# Patient Record
Sex: FEMALE | Race: WHITE | Employment: OTHER | URBAN - NONMETROPOLITAN AREA
[De-identification: names, ages, dates, MRNs, and addresses within clinical notes are randomized per-mention and may not be internally consistent; named-entity substitution may affect disease eponyms.]

---

## 2017-01-30 ENCOUNTER — DOCTOR'S OFFICE (OUTPATIENT)
Dept: URBAN - NONMETROPOLITAN AREA CLINIC 1 | Facility: CLINIC | Age: 82
Setting detail: OPHTHALMOLOGY
End: 2017-01-30
Payer: COMMERCIAL

## 2017-01-30 ENCOUNTER — RX ONLY (RX ONLY)
Age: 82
End: 2017-01-30

## 2017-01-30 DIAGNOSIS — H04.122: ICD-10-CM

## 2017-01-30 DIAGNOSIS — H04.121: ICD-10-CM

## 2017-01-30 DIAGNOSIS — H43.812: ICD-10-CM

## 2017-01-30 DIAGNOSIS — H35.373: ICD-10-CM

## 2017-01-30 DIAGNOSIS — H16.001: ICD-10-CM

## 2017-01-30 DIAGNOSIS — E11.3493: ICD-10-CM

## 2017-01-30 DIAGNOSIS — H43.811: ICD-10-CM

## 2017-01-30 PROCEDURE — 83861 MICROFLUID ANALY TEARS: CPT | Performed by: OPHTHALMOLOGY

## 2017-01-30 PROCEDURE — 92014 COMPRE OPH EXAM EST PT 1/>: CPT | Performed by: OPHTHALMOLOGY

## 2017-01-30 PROCEDURE — 92226 OPHTHALMOSCOPY EXT SUBSEQUENT: CPT | Performed by: OPHTHALMOLOGY

## 2017-01-30 PROCEDURE — 92134 CPTRZ OPH DX IMG PST SGM RTA: CPT | Performed by: OPHTHALMOLOGY

## 2017-01-30 ASSESSMENT — REFRACTION_MANIFEST
OS_VA3: 20/
OS_VA1: 20/
OD_VA2: 20/
OS_VA1: 20/
OS_SPHERE: PLANO
OD_VA3: 20/
OD_VA1: 20/
OS_VA1: 20/25
OD_VA1: 20/
OS_VA3: 20/
OD_VA2: 20/25-2
OU_VA: 20/
OS_VA2: 20/
OS_ADD: +2.50
OS_VA3: 20/
OS_VA2: 20/
OS_AXIS: 030
OD_ADD: +2.50
OU_VA: 20/
OD_VA3: 20/
OD_SPHERE: PLANO
OD_VA2: 20/
OD_VA3: 20/
OD_VA1: 20/25-2
OS_VA2: 20/25
OD_CYLINDER: -0.25
OU_VA: 20/
OD_AXIS: 095
OS_CYLINDER: -0.50

## 2017-01-30 ASSESSMENT — LID POSITION - DERMATOCHALASIS
OD_DERMATOCHALASIS: RUL 2+
OS_DERMATOCHALASIS: LUL 2+

## 2017-01-30 ASSESSMENT — REFRACTION_CURRENTRX
OD_OVR_VA: 20/
OS_OVR_VA: 20/
OD_OVR_VA: 20/
OS_OVR_VA: 20/
OD_OVR_VA: 20/
OS_OVR_VA: 20/

## 2017-01-30 ASSESSMENT — REFRACTION_AUTOREFRACTION
OD_CYLINDER: -0.25
OD_SPHERE: +0.25
OD_AXIS: 98
OS_CYLINDER: -0.50
OS_SPHERE: +0.00
OS_AXIS: 49

## 2017-01-30 ASSESSMENT — SPHEQUIV_DERIVED
OD_SPHEQUIV: 0.125
OS_SPHEQUIV: -0.25

## 2017-01-30 ASSESSMENT — CONFRONTATIONAL VISUAL FIELD TEST (CVF)
OS_FINDINGS: FULL
OD_FINDINGS: FULL

## 2017-01-30 ASSESSMENT — VISUAL ACUITY
OS_BCVA: 20/20-2
OD_BCVA: 20/25

## 2017-02-16 ENCOUNTER — DOCTOR'S OFFICE (OUTPATIENT)
Dept: URBAN - NONMETROPOLITAN AREA CLINIC 1 | Facility: CLINIC | Age: 82
Setting detail: OPHTHALMOLOGY
End: 2017-02-16
Payer: COMMERCIAL

## 2017-02-16 DIAGNOSIS — H04.122: ICD-10-CM

## 2017-02-16 DIAGNOSIS — H04.121: ICD-10-CM

## 2017-02-16 PROCEDURE — 68761 CLOSE TEAR DUCT OPENING: CPT | Performed by: OPHTHALMOLOGY

## 2017-02-16 ASSESSMENT — REFRACTION_MANIFEST
OD_SPHERE: PLANO
OS_ADD: +2.50
OS_VA1: 20/
OS_VA2: 20/
OS_VA2: 20/25
OS_VA3: 20/
OD_VA3: 20/
OS_VA1: 20/
OD_VA1: 20/
OD_VA3: 20/
OD_VA1: 20/25-2
OS_VA3: 20/
OD_CYLINDER: -0.25
OU_VA: 20/
OD_ADD: +2.50
OD_AXIS: 095
OD_VA1: 20/
OS_VA2: 20/
OD_VA3: 20/
OS_CYLINDER: -0.50
OD_VA2: 20/25-2
OU_VA: 20/
OS_VA3: 20/
OS_VA1: 20/25
OD_VA2: 20/
OS_AXIS: 030
OD_VA2: 20/
OU_VA: 20/
OS_SPHERE: PLANO

## 2017-02-16 ASSESSMENT — REFRACTION_AUTOREFRACTION
OS_CYLINDER: -0.50
OD_CYLINDER: -0.25
OD_AXIS: 98
OS_SPHERE: +0.00
OS_AXIS: 49
OD_SPHERE: +0.25

## 2017-02-16 ASSESSMENT — LID POSITION - DERMATOCHALASIS
OD_DERMATOCHALASIS: RUL 2+
OS_DERMATOCHALASIS: LUL 2+

## 2017-02-16 ASSESSMENT — REFRACTION_CURRENTRX
OD_OVR_VA: 20/
OS_OVR_VA: 20/
OD_OVR_VA: 20/
OD_OVR_VA: 20/
OS_OVR_VA: 20/
OS_OVR_VA: 20/

## 2017-02-16 ASSESSMENT — SPHEQUIV_DERIVED
OS_SPHEQUIV: -0.25
OD_SPHEQUIV: 0.125

## 2017-02-16 ASSESSMENT — VISUAL ACUITY
OS_BCVA: 20/20-2
OD_BCVA: 20/25

## 2017-02-16 ASSESSMENT — CONFRONTATIONAL VISUAL FIELD TEST (CVF)
OS_FINDINGS: FULL
OD_FINDINGS: FULL

## 2017-03-16 ENCOUNTER — DOCTOR'S OFFICE (OUTPATIENT)
Dept: URBAN - NONMETROPOLITAN AREA CLINIC 1 | Facility: CLINIC | Age: 82
Setting detail: OPHTHALMOLOGY
End: 2017-03-16
Payer: COMMERCIAL

## 2017-03-16 DIAGNOSIS — H04.121: ICD-10-CM

## 2017-03-16 DIAGNOSIS — H04.122: ICD-10-CM

## 2017-03-16 PROCEDURE — 83861 MICROFLUID ANALY TEARS: CPT | Performed by: OPHTHALMOLOGY

## 2017-03-16 PROCEDURE — 99213 OFFICE O/P EST LOW 20 MIN: CPT | Performed by: OPHTHALMOLOGY

## 2017-03-16 ASSESSMENT — REFRACTION_MANIFEST
OS_VA2: 20/
OU_VA: 20/
OD_VA3: 20/
OD_VA1: 20/
OS_VA1: 20/
OU_VA: 20/
OD_ADD: +2.50
OD_VA2: 20/
OD_CYLINDER: -0.25
OD_VA3: 20/
OS_VA3: 20/
OS_VA1: 20/25
OS_VA3: 20/
OS_AXIS: 030
OS_VA2: 20/25
OD_AXIS: 095
OD_VA1: 20/25-2
OD_VA2: 20/
OD_VA2: 20/25-2
OS_VA3: 20/
OS_VA2: 20/
OS_SPHERE: PLANO
OS_ADD: +2.50
OD_SPHERE: PLANO
OS_VA1: 20/
OS_CYLINDER: -0.50
OU_VA: 20/
OD_VA1: 20/
OD_VA3: 20/

## 2017-03-16 ASSESSMENT — SPHEQUIV_DERIVED
OS_SPHEQUIV: -0.25
OD_SPHEQUIV: 0.125

## 2017-03-16 ASSESSMENT — VISUAL ACUITY
OD_BCVA: 20/25+1
OS_BCVA: 20/20-2

## 2017-03-16 ASSESSMENT — LID POSITION - DERMATOCHALASIS
OD_DERMATOCHALASIS: RUL 2+
OS_DERMATOCHALASIS: LUL 2+

## 2017-03-16 ASSESSMENT — REFRACTION_CURRENTRX
OD_OVR_VA: 20/
OS_OVR_VA: 20/
OD_OVR_VA: 20/
OS_OVR_VA: 20/
OS_OVR_VA: 20/
OD_OVR_VA: 20/

## 2017-03-16 ASSESSMENT — REFRACTION_AUTOREFRACTION
OD_AXIS: 98
OS_CYLINDER: -0.50
OS_AXIS: 49
OS_SPHERE: +0.00
OD_CYLINDER: -0.25
OD_SPHERE: +0.25

## 2017-03-30 ENCOUNTER — DOCTOR'S OFFICE (OUTPATIENT)
Dept: URBAN - NONMETROPOLITAN AREA CLINIC 1 | Facility: CLINIC | Age: 82
Setting detail: OPHTHALMOLOGY
End: 2017-03-30
Payer: COMMERCIAL

## 2017-03-30 DIAGNOSIS — H04.123: ICD-10-CM

## 2017-03-30 PROCEDURE — 68761 CLOSE TEAR DUCT OPENING: CPT | Performed by: OPHTHALMOLOGY

## 2017-03-30 ASSESSMENT — LID POSITION - DERMATOCHALASIS
OS_DERMATOCHALASIS: LUL 2+
OD_DERMATOCHALASIS: RUL 2+

## 2017-03-30 ASSESSMENT — SPHEQUIV_DERIVED
OD_SPHEQUIV: 0.125
OS_SPHEQUIV: -0.25

## 2017-03-30 ASSESSMENT — REFRACTION_MANIFEST
OD_VA1: 20/
OS_VA1: 20/
OS_VA3: 20/
OD_VA3: 20/
OD_VA1: 20/
OS_VA2: 20/
OU_VA: 20/
OS_VA3: 20/
OS_VA2: 20/
OD_VA2: 20/
OD_VA2: 20/
OS_VA1: 20/
OS_VA2: 20/
OD_VA1: 20/
OD_VA2: 20/
OS_VA1: 20/
OS_VA3: 20/
OD_VA3: 20/
OU_VA: 20/
OD_VA3: 20/
OU_VA: 20/

## 2017-03-30 ASSESSMENT — PUNCTA - ASSESSMENT
OD_PUNCTA: SIL PLUG RLL SMALL
OS_PUNCTA: SIL PLUG LLL SMALL

## 2017-03-30 ASSESSMENT — REFRACTION_CURRENTRX
OD_OVR_VA: 20/
OS_OVR_VA: 20/

## 2017-03-30 ASSESSMENT — REFRACTION_AUTOREFRACTION
OD_CYLINDER: -0.25
OS_CYLINDER: -0.50
OS_AXIS: 49
OS_SPHERE: +0.00
OD_AXIS: 98
OD_SPHERE: +0.25

## 2017-03-30 ASSESSMENT — VISUAL ACUITY
OS_BCVA: 20/20-2
OD_BCVA: 20/25+1

## 2017-03-31 ENCOUNTER — DOCTOR'S OFFICE (OUTPATIENT)
Dept: URBAN - NONMETROPOLITAN AREA CLINIC 1 | Facility: CLINIC | Age: 82
Setting detail: OPHTHALMOLOGY
End: 2017-03-31
Payer: COMMERCIAL

## 2017-03-31 DIAGNOSIS — H04.122: ICD-10-CM

## 2017-03-31 DIAGNOSIS — H04.121: ICD-10-CM

## 2017-03-31 PROCEDURE — 99024 POSTOP FOLLOW-UP VISIT: CPT | Performed by: OPHTHALMOLOGY

## 2017-03-31 ASSESSMENT — REFRACTION_CURRENTRX
OD_OVR_VA: 20/
OS_OVR_VA: 20/
OD_OVR_VA: 20/
OD_OVR_VA: 20/

## 2017-03-31 ASSESSMENT — REFRACTION_MANIFEST
OD_VA3: 20/
OS_VA3: 20/
OD_VA1: 20/
OD_VA2: 20/
OD_VA1: 20/
OU_VA: 20/
OD_VA2: 20/
OD_VA1: 20/
OU_VA: 20/
OS_VA1: 20/
OS_VA1: 20/
OU_VA: 20/
OS_VA1: 20/
OS_VA3: 20/
OS_VA2: 20/
OS_VA2: 20/
OD_VA3: 20/
OD_VA3: 20/
OS_VA3: 20/
OD_VA2: 20/
OS_VA2: 20/

## 2017-03-31 ASSESSMENT — REFRACTION_AUTOREFRACTION
OS_CYLINDER: -0.50
OS_AXIS: 49
OD_AXIS: 98
OD_SPHERE: +0.25
OD_CYLINDER: -0.25
OS_SPHERE: +0.00

## 2017-03-31 ASSESSMENT — CONFRONTATIONAL VISUAL FIELD TEST (CVF)
OS_FINDINGS: FULL
OD_FINDINGS: FULL

## 2017-03-31 ASSESSMENT — SPHEQUIV_DERIVED
OD_SPHEQUIV: 0.125
OS_SPHEQUIV: -0.25

## 2017-03-31 ASSESSMENT — LID POSITION - DERMATOCHALASIS
OS_DERMATOCHALASIS: LUL 2+
OD_DERMATOCHALASIS: RUL 2+

## 2017-03-31 ASSESSMENT — VISUAL ACUITY
OS_BCVA: 20/20
OD_BCVA: 20/20

## 2017-03-31 ASSESSMENT — PUNCTA - ASSESSMENT: OS_PUNCTA: SIL PLUG LLL SMALL

## 2017-04-06 ENCOUNTER — RX ONLY (RX ONLY)
Age: 82
End: 2017-04-06

## 2017-04-06 ENCOUNTER — DOCTOR'S OFFICE (OUTPATIENT)
Dept: URBAN - NONMETROPOLITAN AREA CLINIC 1 | Facility: CLINIC | Age: 82
Setting detail: OPHTHALMOLOGY
End: 2017-04-06
Payer: COMMERCIAL

## 2017-04-06 DIAGNOSIS — H04.121: ICD-10-CM

## 2017-04-06 PROCEDURE — 68761 CLOSE TEAR DUCT OPENING: CPT | Performed by: OPHTHALMOLOGY

## 2017-04-06 ASSESSMENT — REFRACTION_AUTOREFRACTION
OD_CYLINDER: -0.25
OS_SPHERE: +0.00
OD_SPHERE: +0.25
OD_AXIS: 98
OS_AXIS: 49
OS_CYLINDER: -0.50

## 2017-04-06 ASSESSMENT — SPHEQUIV_DERIVED
OD_SPHEQUIV: 0.125
OS_SPHEQUIV: -0.25

## 2017-04-06 ASSESSMENT — REFRACTION_MANIFEST
OU_VA: 20/
OU_VA: 20/
OD_VA2: 20/
OS_VA3: 20/
OD_VA2: 20/
OS_VA1: 20/
OS_VA2: 20/
OD_VA3: 20/
OD_VA2: 20/
OD_VA1: 20/
OS_VA3: 20/
OS_VA2: 20/
OS_VA1: 20/
OD_VA1: 20/
OU_VA: 20/
OS_VA3: 20/
OD_VA3: 20/
OS_VA1: 20/
OD_VA1: 20/
OS_VA2: 20/
OD_VA3: 20/

## 2017-04-06 ASSESSMENT — PUNCTA - ASSESSMENT
OS_PUNCTA: SIL PLUG LLL SMALL
OD_PUNCTA: 3MON PLUG RLL SMALL

## 2017-04-06 ASSESSMENT — VISUAL ACUITY
OS_BCVA: 20/20
OD_BCVA: 20/20-2

## 2017-04-06 ASSESSMENT — LID POSITION - DERMATOCHALASIS
OD_DERMATOCHALASIS: RUL 2+
OS_DERMATOCHALASIS: LUL 2+

## 2017-04-06 ASSESSMENT — REFRACTION_CURRENTRX
OD_OVR_VA: 20/
OS_OVR_VA: 20/
OD_OVR_VA: 20/
OD_OVR_VA: 20/
OS_OVR_VA: 20/
OS_OVR_VA: 20/

## 2017-04-06 ASSESSMENT — CONFRONTATIONAL VISUAL FIELD TEST (CVF)
OD_FINDINGS: FULL
OS_FINDINGS: FULL

## 2017-05-18 ENCOUNTER — DOCTOR'S OFFICE (OUTPATIENT)
Dept: URBAN - NONMETROPOLITAN AREA CLINIC 1 | Facility: CLINIC | Age: 82
Setting detail: OPHTHALMOLOGY
End: 2017-05-18
Payer: COMMERCIAL

## 2017-05-18 DIAGNOSIS — H04.121: ICD-10-CM

## 2017-05-18 DIAGNOSIS — H43.811: ICD-10-CM

## 2017-05-18 DIAGNOSIS — H04.122: ICD-10-CM

## 2017-05-18 DIAGNOSIS — E11.3493: ICD-10-CM

## 2017-05-18 DIAGNOSIS — H43.812: ICD-10-CM

## 2017-05-18 PROCEDURE — 83861 MICROFLUID ANALY TEARS: CPT | Performed by: OPHTHALMOLOGY

## 2017-05-18 PROCEDURE — 92226 OPHTHALMOSCOPY EXT SUBSEQUENT: CPT | Performed by: OPHTHALMOLOGY

## 2017-05-18 PROCEDURE — 92014 COMPRE OPH EXAM EST PT 1/>: CPT | Performed by: OPHTHALMOLOGY

## 2017-05-18 PROCEDURE — 92134 CPTRZ OPH DX IMG PST SGM RTA: CPT | Performed by: OPHTHALMOLOGY

## 2017-05-18 ASSESSMENT — PUNCTA - ASSESSMENT
OS_PUNCTA: SIL PLUG LLL SMALL
OD_PUNCTA: 3MON PLUG RLL SMALL

## 2017-05-18 ASSESSMENT — LID POSITION - DERMATOCHALASIS
OS_DERMATOCHALASIS: LUL 2+
OD_DERMATOCHALASIS: RUL 2+

## 2017-05-18 ASSESSMENT — CONFRONTATIONAL VISUAL FIELD TEST (CVF)
OS_FINDINGS: FULL
OD_FINDINGS: FULL

## 2017-05-24 ASSESSMENT — REFRACTION_MANIFEST
OS_VA1: 20/
OS_VA2: 20/
OD_VA3: 20/
OS_VA3: 20/
OU_VA: 20/
OU_VA: 20/
OS_VA1: 20/
OS_VA2: 20/
OD_VA2: 20/
OU_VA: 20/
OD_VA1: 20/
OS_VA1: 20/
OD_VA3: 20/
OD_VA1: 20/
OS_VA3: 20/
OS_VA3: 20/
OD_VA1: 20/
OS_VA2: 20/
OD_VA3: 20/

## 2017-05-24 ASSESSMENT — VISUAL ACUITY
OS_BCVA: 20/15
OD_BCVA: 20/20

## 2017-05-24 ASSESSMENT — SPHEQUIV_DERIVED
OS_SPHEQUIV: -0.25
OD_SPHEQUIV: 0.125

## 2017-05-24 ASSESSMENT — REFRACTION_AUTOREFRACTION
OS_SPHERE: +0.00
OD_AXIS: 98
OS_AXIS: 49
OD_CYLINDER: -0.25
OD_SPHERE: +0.25
OS_CYLINDER: -0.50

## 2017-05-24 ASSESSMENT — REFRACTION_CURRENTRX
OD_OVR_VA: 20/
OS_OVR_VA: 20/
OD_OVR_VA: 20/
OD_OVR_VA: 20/

## 2017-06-22 ENCOUNTER — DOCTOR'S OFFICE (OUTPATIENT)
Dept: URBAN - NONMETROPOLITAN AREA CLINIC 1 | Facility: CLINIC | Age: 82
Setting detail: OPHTHALMOLOGY
End: 2017-06-22
Payer: COMMERCIAL

## 2017-06-22 DIAGNOSIS — E11.3492: ICD-10-CM

## 2017-06-22 DIAGNOSIS — E11.3491: ICD-10-CM

## 2017-06-22 DIAGNOSIS — H04.121: ICD-10-CM

## 2017-06-22 DIAGNOSIS — H43.812: ICD-10-CM

## 2017-06-22 DIAGNOSIS — H35.373: ICD-10-CM

## 2017-06-22 DIAGNOSIS — H04.122: ICD-10-CM

## 2017-06-22 DIAGNOSIS — H43.811: ICD-10-CM

## 2017-06-22 PROCEDURE — 92014 COMPRE OPH EXAM EST PT 1/>: CPT | Performed by: OPHTHALMOLOGY

## 2017-06-22 PROCEDURE — 92134 CPTRZ OPH DX IMG PST SGM RTA: CPT | Performed by: OPHTHALMOLOGY

## 2017-06-22 ASSESSMENT — LID POSITION - DERMATOCHALASIS
OD_DERMATOCHALASIS: RUL 2+
OS_DERMATOCHALASIS: LUL 2+

## 2017-06-22 ASSESSMENT — CONFRONTATIONAL VISUAL FIELD TEST (CVF)
OS_FINDINGS: FULL
OD_FINDINGS: FULL

## 2017-06-22 ASSESSMENT — REFRACTION_AUTOREFRACTION
OD_CYLINDER: -0.25
OD_AXIS: 98
OS_SPHERE: +0.00
OS_CYLINDER: -0.50
OD_SPHERE: +0.25
OS_AXIS: 49

## 2017-06-22 ASSESSMENT — REFRACTION_MANIFEST
OD_VA1: 20/
OS_VA3: 20/
OU_VA: 20/
OS_VA1: 20/
OU_VA: 20/
OS_VA3: 20/
OS_VA2: 20/
OD_VA2: 20/
OD_VA3: 20/
OD_VA2: 20/
OS_VA2: 20/
OD_VA3: 20/
OD_VA1: 20/
OD_VA2: 20/
OD_VA1: 20/
OS_VA2: 20/
OS_VA3: 20/
OD_VA3: 20/
OS_VA1: 20/
OS_VA1: 20/
OU_VA: 20/

## 2017-06-22 ASSESSMENT — REFRACTION_CURRENTRX
OD_OVR_VA: 20/
OS_OVR_VA: 20/
OD_OVR_VA: 20/
OS_OVR_VA: 20/
OS_OVR_VA: 20/
OD_OVR_VA: 20/

## 2017-06-22 ASSESSMENT — PUNCTA - ASSESSMENT
OD_PUNCTA: 3MON PLUG RLL SMALL
OS_PUNCTA: SIL PLUG LLL SMALL

## 2017-06-22 ASSESSMENT — VISUAL ACUITY
OD_BCVA: 20/20-2
OS_BCVA: 20/20

## 2017-06-22 ASSESSMENT — SPHEQUIV_DERIVED
OS_SPHEQUIV: -0.25
OD_SPHEQUIV: 0.125

## 2017-09-28 ENCOUNTER — DOCTOR'S OFFICE (OUTPATIENT)
Dept: URBAN - NONMETROPOLITAN AREA CLINIC 1 | Facility: CLINIC | Age: 82
Setting detail: OPHTHALMOLOGY
End: 2017-09-28
Payer: COMMERCIAL

## 2017-09-28 DIAGNOSIS — H04.122: ICD-10-CM

## 2017-09-28 DIAGNOSIS — E11.3493: ICD-10-CM

## 2017-09-28 DIAGNOSIS — H04.123: ICD-10-CM

## 2017-09-28 DIAGNOSIS — H43.811: ICD-10-CM

## 2017-09-28 DIAGNOSIS — H43.812: ICD-10-CM

## 2017-09-28 DIAGNOSIS — H35.373: ICD-10-CM

## 2017-09-28 PROCEDURE — 92226 OPHTHALMOSCOPY EXT SUBSEQUENT: CPT | Performed by: OPHTHALMOLOGY

## 2017-09-28 PROCEDURE — 83861 MICROFLUID ANALY TEARS: CPT | Performed by: OPHTHALMOLOGY

## 2017-09-28 PROCEDURE — 92014 COMPRE OPH EXAM EST PT 1/>: CPT | Performed by: OPHTHALMOLOGY

## 2017-09-28 PROCEDURE — 92134 CPTRZ OPH DX IMG PST SGM RTA: CPT | Performed by: OPHTHALMOLOGY

## 2017-09-28 ASSESSMENT — REFRACTION_CURRENTRX
OD_OVR_VA: 20/
OS_OVR_VA: 20/
OS_OVR_VA: 20/
OD_OVR_VA: 20/
OS_OVR_VA: 20/
OD_OVR_VA: 20/

## 2017-09-28 ASSESSMENT — REFRACTION_MANIFEST
OS_VA2: 20/
OS_VA3: 20/
OD_VA2: 20/
OS_VA1: 20/
OD_VA1: 20/
OU_VA: 20/
OS_VA1: 20/
OU_VA: 20/
OD_VA3: 20/
OD_VA1: 20/
OD_VA2: 20/
OD_VA1: 20/
OU_VA: 20/
OD_VA2: 20/
OS_VA2: 20/
OD_VA3: 20/
OS_VA3: 20/
OS_VA2: 20/
OD_VA3: 20/
OS_VA1: 20/
OS_VA3: 20/

## 2017-09-28 ASSESSMENT — SPHEQUIV_DERIVED
OD_SPHEQUIV: 0.125
OS_SPHEQUIV: -0.25

## 2017-09-28 ASSESSMENT — CONFRONTATIONAL VISUAL FIELD TEST (CVF)
OS_FINDINGS: FULL
OD_FINDINGS: FULL

## 2017-09-28 ASSESSMENT — REFRACTION_AUTOREFRACTION
OD_CYLINDER: -0.25
OD_SPHERE: +0.25
OS_AXIS: 49
OD_AXIS: 98
OS_SPHERE: +0.00
OS_CYLINDER: -0.50

## 2017-09-28 ASSESSMENT — LID POSITION - DERMATOCHALASIS
OD_DERMATOCHALASIS: RUL 2+
OS_DERMATOCHALASIS: LUL 2+

## 2017-09-28 ASSESSMENT — VISUAL ACUITY
OD_BCVA: 20/25+2
OS_BCVA: 20/20

## 2017-09-28 ASSESSMENT — PUNCTA - ASSESSMENT
OS_PUNCTA: SIL PLUG LLL SMALL
OD_PUNCTA: 3MON PLUG RLL SMALL

## 2017-11-15 ENCOUNTER — APPOINTMENT (OUTPATIENT)
Dept: PHYSICAL THERAPY | Facility: CLINIC | Age: 82
End: 2017-11-15
Payer: COMMERCIAL

## 2017-11-15 PROCEDURE — 97140 MANUAL THERAPY 1/> REGIONS: CPT

## 2017-11-15 PROCEDURE — 97162 PT EVAL MOD COMPLEX 30 MIN: CPT

## 2017-11-15 PROCEDURE — 97110 THERAPEUTIC EXERCISES: CPT

## 2017-11-15 PROCEDURE — G8985 CARRY GOAL STATUS: HCPCS

## 2017-11-15 PROCEDURE — 97535 SELF CARE MNGMENT TRAINING: CPT

## 2017-11-15 PROCEDURE — G8984 CARRY CURRENT STATUS: HCPCS

## 2017-11-20 ENCOUNTER — APPOINTMENT (OUTPATIENT)
Dept: PHYSICAL THERAPY | Facility: CLINIC | Age: 82
End: 2017-11-20
Payer: COMMERCIAL

## 2017-11-20 PROCEDURE — 97110 THERAPEUTIC EXERCISES: CPT

## 2017-11-20 PROCEDURE — 97140 MANUAL THERAPY 1/> REGIONS: CPT

## 2017-11-22 ENCOUNTER — APPOINTMENT (OUTPATIENT)
Dept: PHYSICAL THERAPY | Facility: CLINIC | Age: 82
End: 2017-11-22
Payer: COMMERCIAL

## 2017-11-22 PROCEDURE — 97110 THERAPEUTIC EXERCISES: CPT

## 2017-11-22 PROCEDURE — 97140 MANUAL THERAPY 1/> REGIONS: CPT

## 2017-11-27 ENCOUNTER — APPOINTMENT (OUTPATIENT)
Dept: PHYSICAL THERAPY | Facility: CLINIC | Age: 82
End: 2017-11-27
Payer: COMMERCIAL

## 2017-11-29 ENCOUNTER — APPOINTMENT (OUTPATIENT)
Dept: PHYSICAL THERAPY | Facility: CLINIC | Age: 82
End: 2017-11-29
Payer: COMMERCIAL

## 2017-11-29 PROCEDURE — 97140 MANUAL THERAPY 1/> REGIONS: CPT

## 2017-11-29 PROCEDURE — 97110 THERAPEUTIC EXERCISES: CPT

## 2017-11-30 ENCOUNTER — APPOINTMENT (OUTPATIENT)
Dept: PHYSICAL THERAPY | Facility: CLINIC | Age: 82
End: 2017-11-30
Payer: COMMERCIAL

## 2017-11-30 PROCEDURE — 97140 MANUAL THERAPY 1/> REGIONS: CPT

## 2017-11-30 PROCEDURE — 97110 THERAPEUTIC EXERCISES: CPT

## 2017-12-04 ENCOUNTER — APPOINTMENT (OUTPATIENT)
Dept: PHYSICAL THERAPY | Facility: CLINIC | Age: 82
End: 2017-12-04
Payer: COMMERCIAL

## 2017-12-04 PROCEDURE — 97110 THERAPEUTIC EXERCISES: CPT

## 2017-12-04 PROCEDURE — 97140 MANUAL THERAPY 1/> REGIONS: CPT

## 2017-12-07 ENCOUNTER — APPOINTMENT (OUTPATIENT)
Dept: PHYSICAL THERAPY | Facility: CLINIC | Age: 82
End: 2017-12-07
Payer: COMMERCIAL

## 2017-12-07 PROCEDURE — 97140 MANUAL THERAPY 1/> REGIONS: CPT

## 2017-12-07 PROCEDURE — 97110 THERAPEUTIC EXERCISES: CPT

## 2017-12-11 ENCOUNTER — APPOINTMENT (OUTPATIENT)
Dept: PHYSICAL THERAPY | Facility: CLINIC | Age: 82
End: 2017-12-11
Payer: COMMERCIAL

## 2017-12-11 PROCEDURE — 97140 MANUAL THERAPY 1/> REGIONS: CPT

## 2017-12-11 PROCEDURE — 97110 THERAPEUTIC EXERCISES: CPT

## 2017-12-14 ENCOUNTER — APPOINTMENT (OUTPATIENT)
Dept: PHYSICAL THERAPY | Facility: CLINIC | Age: 82
End: 2017-12-14
Payer: COMMERCIAL

## 2017-12-14 PROCEDURE — G8984 CARRY CURRENT STATUS: HCPCS

## 2017-12-14 PROCEDURE — 97110 THERAPEUTIC EXERCISES: CPT

## 2017-12-14 PROCEDURE — 97140 MANUAL THERAPY 1/> REGIONS: CPT

## 2017-12-14 PROCEDURE — G8985 CARRY GOAL STATUS: HCPCS

## 2017-12-18 ENCOUNTER — APPOINTMENT (OUTPATIENT)
Dept: PHYSICAL THERAPY | Facility: CLINIC | Age: 82
End: 2017-12-18
Payer: COMMERCIAL

## 2017-12-21 ENCOUNTER — APPOINTMENT (OUTPATIENT)
Dept: PHYSICAL THERAPY | Facility: CLINIC | Age: 82
End: 2017-12-21
Payer: COMMERCIAL

## 2017-12-27 ENCOUNTER — APPOINTMENT (OUTPATIENT)
Dept: PHYSICAL THERAPY | Facility: CLINIC | Age: 82
End: 2017-12-27
Payer: COMMERCIAL

## 2017-12-28 ENCOUNTER — APPOINTMENT (OUTPATIENT)
Dept: PHYSICAL THERAPY | Facility: CLINIC | Age: 82
End: 2017-12-28
Payer: COMMERCIAL

## 2018-01-02 ENCOUNTER — APPOINTMENT (OUTPATIENT)
Dept: PHYSICAL THERAPY | Facility: CLINIC | Age: 83
End: 2018-01-02
Payer: COMMERCIAL

## 2018-01-04 ENCOUNTER — APPOINTMENT (OUTPATIENT)
Dept: PHYSICAL THERAPY | Facility: CLINIC | Age: 83
End: 2018-01-04
Payer: COMMERCIAL

## 2018-01-08 ENCOUNTER — APPOINTMENT (OUTPATIENT)
Dept: PHYSICAL THERAPY | Facility: CLINIC | Age: 83
End: 2018-01-08
Payer: COMMERCIAL

## 2018-01-09 ENCOUNTER — APPOINTMENT (OUTPATIENT)
Dept: PHYSICAL THERAPY | Facility: CLINIC | Age: 83
End: 2018-01-09
Payer: COMMERCIAL

## 2018-01-10 ENCOUNTER — APPOINTMENT (OUTPATIENT)
Dept: PHYSICAL THERAPY | Facility: CLINIC | Age: 83
End: 2018-01-10
Payer: COMMERCIAL

## 2018-01-11 ENCOUNTER — APPOINTMENT (OUTPATIENT)
Dept: PHYSICAL THERAPY | Facility: CLINIC | Age: 83
End: 2018-01-11
Payer: COMMERCIAL

## 2018-01-11 PROCEDURE — G8984 CARRY CURRENT STATUS: HCPCS

## 2018-01-11 PROCEDURE — 97110 THERAPEUTIC EXERCISES: CPT

## 2018-01-11 PROCEDURE — 97140 MANUAL THERAPY 1/> REGIONS: CPT

## 2018-01-11 PROCEDURE — G8985 CARRY GOAL STATUS: HCPCS

## 2018-01-11 PROCEDURE — 97164 PT RE-EVAL EST PLAN CARE: CPT

## 2018-01-15 ENCOUNTER — APPOINTMENT (OUTPATIENT)
Dept: PHYSICAL THERAPY | Facility: CLINIC | Age: 83
End: 2018-01-15
Payer: COMMERCIAL

## 2018-01-17 ENCOUNTER — APPOINTMENT (OUTPATIENT)
Dept: PHYSICAL THERAPY | Facility: CLINIC | Age: 83
End: 2018-01-17
Payer: COMMERCIAL

## 2018-01-22 ENCOUNTER — APPOINTMENT (OUTPATIENT)
Dept: PHYSICAL THERAPY | Facility: CLINIC | Age: 83
End: 2018-01-22
Payer: COMMERCIAL

## 2018-01-22 PROCEDURE — 97140 MANUAL THERAPY 1/> REGIONS: CPT

## 2018-01-22 PROCEDURE — 97110 THERAPEUTIC EXERCISES: CPT

## 2018-01-25 ENCOUNTER — TELEPHONE (OUTPATIENT)
Dept: PHYSICAL THERAPY | Facility: CLINIC | Age: 83
End: 2018-01-25

## 2018-01-25 ENCOUNTER — OFFICE VISIT (OUTPATIENT)
Dept: PHYSICAL THERAPY | Facility: CLINIC | Age: 83
End: 2018-01-25
Payer: COMMERCIAL

## 2018-01-25 DIAGNOSIS — S52.601D FRACTURE OF RADIUS, DISTAL, WITH ULNA, RIGHT, CLOSED, WITH ROUTINE HEALING, SUBSEQUENT ENCOUNTER: Primary | ICD-10-CM

## 2018-01-25 DIAGNOSIS — S52.501D FRACTURE OF RADIUS, DISTAL, WITH ULNA, RIGHT, CLOSED, WITH ROUTINE HEALING, SUBSEQUENT ENCOUNTER: Primary | ICD-10-CM

## 2018-01-25 PROCEDURE — 97110 THERAPEUTIC EXERCISES: CPT

## 2018-01-25 PROCEDURE — 97140 MANUAL THERAPY 1/> REGIONS: CPT

## 2018-01-25 NOTE — PROGRESS NOTES
Daily Note     Today's date: 2018  Patient name: Nettie Gupta  : 1935  MRN: [id number]  Referring provider: Larry Pruett DO  Dx:   Encounter Diagnosis   Name Primary?  Fracture of radius, distal, with ulna, right, closed, with routine healing, subsequent encounter Yes                  Subjective: Patient states "I have pain in my wrist today "    Patient reports having pain in chest area yesterday after eating fried food and a doughnut  Patient reports taking a nitro tab  Patient reports felt better after taking  Patient did not call 911 or notify any medical personnel or family of the event  Objective: See treatment diary below   Precautions: none    Daily Treatment Diary     Manual              R wrist stretching 15 min                                                                    Exercise Diary              Wrist flexion 1# 3x10            Wrist extension 1# 3x10            Wrist Rd/UD 1# 3x10            Bicep curls 1# 3x10            theraputty , roll and pinch 8 min            theraball squeeze 2 min            digiflex  and pinch Red 2min each            UBE 5 min                                                                                                                                                                            Modalities              CP 10 min                                                Assessment: Tolerated treatment well  Patient has increased rom in right wrist   Patient's strength is steadily improving  Added UBE today  Called patient's family physician to report incident and the use of nitro  Plan: Continue per plan of care

## 2018-01-29 ENCOUNTER — APPOINTMENT (OUTPATIENT)
Dept: PHYSICAL THERAPY | Facility: CLINIC | Age: 83
End: 2018-01-29
Payer: COMMERCIAL

## 2018-01-30 ENCOUNTER — APPOINTMENT (OUTPATIENT)
Dept: PHYSICAL THERAPY | Facility: CLINIC | Age: 83
End: 2018-01-30
Payer: COMMERCIAL

## 2018-02-01 ENCOUNTER — OFFICE VISIT (OUTPATIENT)
Dept: PHYSICAL THERAPY | Facility: CLINIC | Age: 83
End: 2018-02-01
Payer: COMMERCIAL

## 2018-02-01 DIAGNOSIS — S52.501D FRACTURE OF RADIUS, DISTAL, WITH ULNA, RIGHT, CLOSED, WITH ROUTINE HEALING, SUBSEQUENT ENCOUNTER: Primary | ICD-10-CM

## 2018-02-01 DIAGNOSIS — S52.601D FRACTURE OF RADIUS, DISTAL, WITH ULNA, RIGHT, CLOSED, WITH ROUTINE HEALING, SUBSEQUENT ENCOUNTER: Primary | ICD-10-CM

## 2018-02-01 PROCEDURE — 97110 THERAPEUTIC EXERCISES: CPT

## 2018-02-01 PROCEDURE — 97140 MANUAL THERAPY 1/> REGIONS: CPT

## 2018-02-01 NOTE — PROGRESS NOTES
Daily Note     Today's date: 2018  Patient name: Laci Rubio  : 1935  MRN: [id number]  Referring provider: Anshul Recinos DO  Dx:   Encounter Diagnosis   Name Primary?  Fracture of radius, distal, with ulna, right, closed, with routine healing, subsequent encounter Yes                  Subjective: Patient reports increased pain over styloid process of right ulna  Objective: See treatment diary below    Daily Treatment Diary      Manual    21                   R wrist stretching 15 min  15 min                                                                                                                         Exercise Diary                     Wrist flexion 1# 3x10  1# 3x10                   Wrist extension 1# 3x10  1# 3x10                   Wrist Rd/UD 1# 3x10  1# 3x10                   Bicep curls 1# 3x10  1# 3x10                   theraputty , roll and pinch 8 min  8 min                   theraball squeeze 2 min  2 min                   digiflex  and pinch Red 2min each  red 2min eacg                   UBE 5 min  6 min                                                                                                                                                                                                                                                                                                                         Modalities                     CP 10 min                                                                         Assessment: Tolerated treatment well  Patient would benefit from continued PT      Plan: Continue per plan of care

## 2018-02-06 ENCOUNTER — APPOINTMENT (OUTPATIENT)
Dept: PHYSICAL THERAPY | Facility: CLINIC | Age: 83
End: 2018-02-06
Payer: COMMERCIAL

## 2018-02-08 ENCOUNTER — APPOINTMENT (OUTPATIENT)
Dept: PHYSICAL THERAPY | Facility: CLINIC | Age: 83
End: 2018-02-08
Payer: COMMERCIAL

## 2018-02-12 ENCOUNTER — OFFICE VISIT (OUTPATIENT)
Dept: PHYSICAL THERAPY | Facility: CLINIC | Age: 83
End: 2018-02-12
Payer: COMMERCIAL

## 2018-02-12 DIAGNOSIS — S52.601D FRACTURE OF RADIUS, DISTAL, WITH ULNA, RIGHT, CLOSED, WITH ROUTINE HEALING, SUBSEQUENT ENCOUNTER: Primary | ICD-10-CM

## 2018-02-12 DIAGNOSIS — S52.501D FRACTURE OF RADIUS, DISTAL, WITH ULNA, RIGHT, CLOSED, WITH ROUTINE HEALING, SUBSEQUENT ENCOUNTER: Primary | ICD-10-CM

## 2018-02-12 PROCEDURE — 97110 THERAPEUTIC EXERCISES: CPT

## 2018-02-12 PROCEDURE — 97140 MANUAL THERAPY 1/> REGIONS: CPT

## 2018-02-12 NOTE — PROGRESS NOTES
Daily Note     Today's date: 2018  Patient name: Lea Lee  : 1935  MRN: [id number]  Referring provider: Felicita Sales DO  Dx:   Encounter Diagnosis   Name Primary?  Fracture of radius, distal, with ulna, right, closed, with routine healing, subsequent encounter Yes                  Subjective: Patient reports able to use right wrist and hand more with ADL's  Objective: See treatment diary below  Manual                   R wrist stretching 15 min  15 min  15 min                                                                                                                       Exercise Diary                   Wrist flexion 1# 3x10  1# 3x10  1# 3x10                 Wrist extension 1# 3x10  1# 3x10  1# 3x10                 Wrist Rd/UD 1# 3x10  1# 3x10  1# 3x10                 Bicep curls 1# 3x10  1# 3x10  1# 3x10                 theraputty , roll and pinch 8 min  8 min  8 min                 theraball squeeze 2 min  2 min  2 min                 digiflex  and pinch Red 2min each  red 2min eacg  red  2 min each                 UBE 5 min  6 min  6 min                                                                                                                                                                                                                                                                                                                       Modalities                     CP 10 min                                                                           Assessment: Tolerated treatment well  Patient would benefit from continued PT  Patient requires frequent Vcs for proper performance  Plan: Continue per plan of care

## 2018-02-15 ENCOUNTER — OFFICE VISIT (OUTPATIENT)
Dept: PHYSICAL THERAPY | Facility: CLINIC | Age: 83
End: 2018-02-15
Payer: COMMERCIAL

## 2018-02-15 DIAGNOSIS — S52.501D FRACTURE OF RADIUS, DISTAL, WITH ULNA, RIGHT, CLOSED, WITH ROUTINE HEALING, SUBSEQUENT ENCOUNTER: Primary | ICD-10-CM

## 2018-02-15 DIAGNOSIS — S52.601D FRACTURE OF RADIUS, DISTAL, WITH ULNA, RIGHT, CLOSED, WITH ROUTINE HEALING, SUBSEQUENT ENCOUNTER: Primary | ICD-10-CM

## 2018-02-15 PROCEDURE — 97140 MANUAL THERAPY 1/> REGIONS: CPT

## 2018-02-15 PROCEDURE — 97110 THERAPEUTIC EXERCISES: CPT

## 2018-02-15 NOTE — PROGRESS NOTES
Daily Note     Today's date: 2/15/2018  Patient name: Paul Pallas  : 1935  MRN: [id number]  Referring provider: Daphne Gaston DO  Dx:   Encounter Diagnosis   Name Primary?  Fracture of radius, distal, with ulna, right, closed, with routine healing, subsequent encounter Yes                  Subjective: Patient reports able to perform more activities with right wrist but notices increased pain along lateral side of wrist and forearm  Objective: See treatment diary below  Manual  1/25  2/1  2/12  2/15               R wrist stretching 15 min  15 min  15 min  15 min                                                                                                                     Exercise Diary  1/25  2/1  2/12  2/15               Wrist flexion 1# 3x10  1# 3x10  1# 3x10  1# 3x10               Wrist extension 1# 3x10  1# 3x10  1# 3x10  1# 3x10               Wrist Rd/UD 1# 3x10  1# 3x10  1# 3x10  1# 3x10               Bicep curls 1# 3x10  1# 3x10  1# 3x10  1# 3x10               theraputty , roll and pinch 8 min  8 min  8 min  8 min               theraball squeeze 2 min  2 min  2 min  2 min               digiflex  and pinch Red 2min each  red 2min eacg  red  2 min each  red 2min each               UBE 5 min  6 min  6 min  7 min                                                                                                                                                                                                                                                                                                                     Modalities                     CP 10 min                                                                           Assessment: Tolerated treatment well  Patient would benefit from continued PT  Difficulty progressing secondary to pain  Plan: Continue per plan of care

## 2018-02-19 ENCOUNTER — OFFICE VISIT (OUTPATIENT)
Dept: PHYSICAL THERAPY | Facility: CLINIC | Age: 83
End: 2018-02-19
Payer: COMMERCIAL

## 2018-02-19 DIAGNOSIS — S52.501D FRACTURE OF RADIUS, DISTAL, WITH ULNA, RIGHT, CLOSED, WITH ROUTINE HEALING, SUBSEQUENT ENCOUNTER: Primary | ICD-10-CM

## 2018-02-19 DIAGNOSIS — S52.601D FRACTURE OF RADIUS, DISTAL, WITH ULNA, RIGHT, CLOSED, WITH ROUTINE HEALING, SUBSEQUENT ENCOUNTER: Primary | ICD-10-CM

## 2018-02-19 PROCEDURE — 97140 MANUAL THERAPY 1/> REGIONS: CPT

## 2018-02-19 PROCEDURE — 97110 THERAPEUTIC EXERCISES: CPT

## 2018-02-19 NOTE — PROGRESS NOTES
Daily Note     Today's date: 2018  Patient name: Buck Dominique  : 1935  MRN: [id number]  Referring provider: Aditya Rojas DO  Dx: No diagnosis found  Subjective: Patient reports increased right wrist and finger pain at night  Patient reports " I am having a bad day  I have no strength in my arm today "      Objective: See treatment diary below  Manual  1/25  2/1  2/12  2/15  2/19             R wrist stretching 15 min  15 min  15 min  15 min  15 min                                                                                                                   Exercise Diary  1/25  2/1  2/12  2/15  2/19             Wrist flexion 1# 3x10  1# 3x10  1# 3x10  1# 3x10  1# 3x10             Wrist extension 1# 3x10  1# 3x10  1# 3x10  1# 3x10  1# 3x10             Wrist Rd/UD 1# 3x10  1# 3x10  1# 3x10  1# 3x10  1# 3x10             Bicep curls 1# 3x10  1# 3x10  1# 3x10  1# 3x10  1# 3x10             theraputty , roll and pinch 8 min  8 min  8 min  8 min  8 min             theraball squeeze 2 min  2 min  2 min  2 min  2 min             digiflex  and pinch Red 2min each  red 2min eacg  red  2 min each  red 2min each  red 2min each             UBE 5 min  6 min  6 min  7 min  8 min                                                                                                                                                                                                                                                                                                                   Modalities                     CP 10 min                                                                           Assessment: Tolerated treatment well  Patient exhibited good technique with therapeutic exercises  Difficult to progress secondary to increased pain  Plan: Potential discharge next visit

## 2018-02-22 ENCOUNTER — OFFICE VISIT (OUTPATIENT)
Dept: PHYSICAL THERAPY | Facility: CLINIC | Age: 83
End: 2018-02-22
Payer: COMMERCIAL

## 2018-02-22 DIAGNOSIS — S52.501D FRACTURE OF RADIUS, DISTAL, WITH ULNA, RIGHT, CLOSED, WITH ROUTINE HEALING, SUBSEQUENT ENCOUNTER: Primary | ICD-10-CM

## 2018-02-22 DIAGNOSIS — S52.601D FRACTURE OF RADIUS, DISTAL, WITH ULNA, RIGHT, CLOSED, WITH ROUTINE HEALING, SUBSEQUENT ENCOUNTER: Primary | ICD-10-CM

## 2018-02-22 PROCEDURE — 97110 THERAPEUTIC EXERCISES: CPT

## 2018-02-22 PROCEDURE — G8990 OTHER PT/OT CURRENT STATUS: HCPCS

## 2018-02-22 PROCEDURE — 97164 PT RE-EVAL EST PLAN CARE: CPT

## 2018-02-22 PROCEDURE — G8991 OTHER PT/OT GOAL STATUS: HCPCS

## 2018-02-22 PROCEDURE — 97140 MANUAL THERAPY 1/> REGIONS: CPT

## 2018-02-22 NOTE — PROGRESS NOTES
PT Re-Evaluation     Today's date: 2018  Patient name: Shira Sullivan  : 1935  MRN: [id number]  Referring provider: Yvonne Roberts DO  Dx:   Encounter Diagnosis     ICD-10-CM    1  Fracture of radius, distal, with ulna, right, closed, with routine healing, subsequent encounter S52 501D     S52 601D        Start Time: 1350  Stop Time: 1500  Total time in clinic (min): 70 minutes    Assessment    Assessment details: Patient has progressed well with PT POC and has met set goals/reached maximum functional potential with Pt  Patient reports she is able to perform her ADL's without issue at home  Patient to be d/c to HEP at this time       Plan  Treatment plan discussed with: patient        Subjective Evaluation    Pain  Current pain ratin  At best pain ratin  At worst pain ratin    Treatments  Current treatment: physical therapy        Objective     Active Range of Motion     Left Wrist   Wrist flexion: WFL  Wrist extension: [facility]  Radial deviation: WFL  Ulnar deviation: WFL      Right Wrist   Wrist flexion: WFL  Wrist extension: WFl  Radial deviation: WFL  Ulnar deviation: WFL    Strength/Myotome Testing     Left Wrist/Hand   Wrist extension: 4+  Wrist flexion: 4+  Radial deviation: 4+  Ulnar deviation: 4+    Right Wrist/Hand   Wrist extension: 4+  Wrist flexion: 4+  Radial deviation: 4+  Ulnar deviation: 4+          Precautions: None    Daily Treatment Diary     Manual                                                                                   Exercise Diary                                                                                                                                                                                                                                                                                      Modalities

## 2018-02-22 NOTE — LETTER
2018    Albert Cabrera DO  [address]    Patient: Shira Sullivan   YOB: 1935   Date of Visit: 2018     Encounter Diagnosis     ICD-10-CM    1  Fracture of radius, distal, with ulna, right, closed, with routine healing, subsequent encounter S52 501D     S52 556I        Dear Dr Eladia Lombardi:    Please review the attached Plan of Care from [facility] recent visit  Please verify that you agree therapy should continue by signing the attached document and sending it back to our office  If you have any questions or concerns, please don't hesitate to call  Sincerely,    Carlene Nissen, PT      Referring Provider:      I certify that I have read the below Plan of Care and certify the need for these services furnished under this plan of treatment while under my care  Albert Cabrera DO  [address] [place] [address]  VIA Mail          Daily Note     Today's date: 2018  Patient name: Shira Sullivan  : 1935  MRN: [id number]  Referring provider: Yvonne Roberts DO  Dx:   Encounter Diagnosis     ICD-10-CM    1  Fracture of radius, distal, with ulna, right, closed, with routine healing, subsequent encounter S52 501D     C18 [id number]                   Subjective: 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  Exercise decreases the pain  Patient reports able to perform ADLs with right hand now        Objective: See treatment diary below  Manual  1/25  2/1  2/12  2/15  2/19  2/22           R wrist stretching 15 min  15 min  15 min  15 min  15 min  15 min                                                                                                                 Exercise Diary  1/25  2/1  2/12  2/15  2/19  2/22           Wrist flexion 1# 3x10  1# 3x10  1# 3x10  1# 3x10  1# 3x10  1# 3x10           Wrist extension 1# 3x10  1# 3x10  1# 3x10  1# 3x10  1# 3x10  1# 3x10           Wrist Rd/UD 1# 3x10  1# 3x10  1# 3x10  1# 3x10  1# 3x10  1# 3x10           Bicep curls 1# 3x10  1# 3x10  1# 3x10  1# 3x10  1# 3x10  1# 3x10           theraputty , roll and pinch 8 min  8 min  8 min  8 min  8 min             theraball squeeze 2 min  2 min  2 min  2 min  2 min  2 min           digiflex  and pinch Red 2min each  red 2min eacg  red  2 min each  red 2min each  red 2min each  red 2min each           UBE 5 min  6 min  6 min  7 min  8 min  8 min                                                                                                                                                                                                                                                                                                                 Modalities                     CP 10 min                                                                           Assessment: Tolerated treatment well  Patient demonstrates fatigue post treatment  Patient has HEP to perform daily  RE performed      Plan: D/C to HEP at this time  PT Re-Evaluation     Today's date: 2018  Patient name: Paul Pallas  : 1935  MRN: [id number]  Referring provider: Daphne Gaston DO  Dx:   Encounter Diagnosis     ICD-10-CM    1  Fracture of radius, distal, with ulna, right, closed, with routine healing, subsequent encounter S52 501D     S52 601D        Start Time: 1350  Stop Time: 1500  Total time in clinic (min): 70 minutes    Assessment    Assessment details: Patient has progressed well with PT POC and has met set goals/reached maximum functional potential with Pt  Patient reports she is able to perform her ADL's without issue at home  Patient to be d/c to HEP at this time       Plan  Treatment plan discussed with: patient        Subjective Evaluation    Pain  Current pain ratin  At best pain ratin  At worst pain ratin    Treatments  Current treatment: physical therapy        Objective     Active Range of Motion Left Wrist   Wrist flexion: WFL  Wrist extension: [facility]  Radial deviation: WFL  Ulnar deviation: WFL      Right Wrist   Wrist flexion: WFL  Wrist extension: WFl  Radial deviation: WFL  Ulnar deviation: WFL    Strength/Myotome Testing     Left Wrist/Hand   Wrist extension: 4+  Wrist flexion: 4+  Radial deviation: 4+  Ulnar deviation: 4+    Right Wrist/Hand   Wrist extension: 4+  Wrist flexion: 4+  Radial deviation: 4+  Ulnar deviation: 4+          Precautions: None    Daily Treatment Diary     Manual                                                                                   Exercise Diary                                                                                                                                                                                                                                                                                      Modalities

## 2018-02-22 NOTE — PROGRESS NOTES
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  Exercise decreases the pain  Patient reports able to perform ADLs with right hand now  Objective: See treatment diary below  Manual  1/25  2/1  2/12  2/15  2/19  2/22           R wrist stretching 15 min  15 min  15 min  15 min  15 min  15 min                                                                                                                 Exercise Diary  1/25  2/1  2/12  2/15  2/19  2/22           Wrist flexion 1# 3x10  1# 3x10  1# 3x10  1# 3x10  1# 3x10  1# 3x10           Wrist extension 1# 3x10  1# 3x10  1# 3x10  1# 3x10  1# 3x10  1# 3x10           Wrist Rd/UD 1# 3x10  1# 3x10  1# 3x10  1# 3x10  1# 3x10  1# 3x10           Bicep curls 1# 3x10  1# 3x10  1# 3x10  1# 3x10  1# 3x10  1# 3x10           theraputty , roll and pinch 8 min  8 min  8 min  8 min  8 min             theraball squeeze 2 min  2 min  2 min  2 min  2 min  2 min           digiflex  and pinch Red 2min each  red 2min eacg  red  2 min each  red 2min each  red 2min each  red 2min each           UBE 5 min  6 min  6 min  7 min  8 min  8 min                                                                                                                                                                                                                                                                                                                 Modalities                     CP 10 min                                                                           Assessment: Tolerated treatment well   Patient demonstrates fatigue post treatment  Patient has HEP to perform daily  RE performed      Plan: D/C to HEP at this time

## 2018-02-23 ENCOUNTER — TRANSCRIBE ORDERS (OUTPATIENT)
Dept: PHYSICAL THERAPY | Facility: CLINIC | Age: 83
End: 2018-02-23

## 2018-02-23 DIAGNOSIS — S52.691D OTHER CLOSED FRACTURE OF DISTAL END OF RIGHT ULNA WITH ROUTINE HEALING, SUBSEQUENT ENCOUNTER: Primary | ICD-10-CM

## 2018-05-14 ENCOUNTER — DOCTOR'S OFFICE (OUTPATIENT)
Dept: URBAN - NONMETROPOLITAN AREA CLINIC 1 | Facility: CLINIC | Age: 83
Setting detail: OPHTHALMOLOGY
End: 2018-05-14
Payer: COMMERCIAL

## 2018-05-14 DIAGNOSIS — E11.3493: ICD-10-CM

## 2018-05-14 DIAGNOSIS — H43.811: ICD-10-CM

## 2018-05-14 DIAGNOSIS — H43.812: ICD-10-CM

## 2018-05-14 DIAGNOSIS — H04.123: ICD-10-CM

## 2018-05-14 DIAGNOSIS — H35.373: ICD-10-CM

## 2018-05-14 DIAGNOSIS — H43.813: ICD-10-CM

## 2018-05-14 PROCEDURE — 92226 OPHTHALMOSCOPY EXT SUBSEQUENT: CPT | Performed by: OPHTHALMOLOGY

## 2018-05-14 PROCEDURE — 92014 COMPRE OPH EXAM EST PT 1/>: CPT | Performed by: OPHTHALMOLOGY

## 2018-05-14 PROCEDURE — 92250 FUNDUS PHOTOGRAPHY W/I&R: CPT | Performed by: OPHTHALMOLOGY

## 2018-05-14 PROCEDURE — 92235 FLUORESCEIN ANGRPH MLTIFRAME: CPT | Performed by: OPHTHALMOLOGY

## 2018-05-14 ASSESSMENT — REFRACTION_AUTOREFRACTION
OS_SPHERE: +0.00
OD_CYLINDER: -0.25
OS_AXIS: 49
OS_CYLINDER: -0.50
OD_AXIS: 98
OD_SPHERE: +0.25

## 2018-05-14 ASSESSMENT — REFRACTION_MANIFEST
OS_VA1: 20/
OS_VA1: 20/
OU_VA: 20/
OS_VA2: 20/
OD_VA1: 20/
OD_VA3: 20/
OS_VA3: 20/
OU_VA: 20/
OD_VA3: 20/
OS_VA3: 20/
OD_VA2: 20/
OD_VA2: 20/
OS_VA2: 20/
OS_VA3: 20/
OD_VA1: 20/
OD_VA1: 20/
OD_VA2: 20/
OU_VA: 20/
OS_VA1: 20/
OS_VA2: 20/
OD_VA3: 20/

## 2018-05-14 ASSESSMENT — PUNCTA - ASSESSMENT
OD_PUNCTA: 3MON PLUG RLL SMALL
OS_PUNCTA: SIL PLUG LLL SMALL

## 2018-05-14 ASSESSMENT — REFRACTION_CURRENTRX
OD_OVR_VA: 20/
OD_OVR_VA: 20/
OS_OVR_VA: 20/
OD_OVR_VA: 20/
OS_OVR_VA: 20/
OS_OVR_VA: 20/

## 2018-05-14 ASSESSMENT — SPHEQUIV_DERIVED
OD_SPHEQUIV: 0.125
OS_SPHEQUIV: -0.25

## 2018-05-14 ASSESSMENT — VISUAL ACUITY
OS_BCVA: 20/20
OD_BCVA: 20/20

## 2018-05-14 ASSESSMENT — LID POSITION - DERMATOCHALASIS
OS_DERMATOCHALASIS: LUL 2+
OD_DERMATOCHALASIS: RUL 2+

## 2018-05-14 ASSESSMENT — CONFRONTATIONAL VISUAL FIELD TEST (CVF)
OD_FINDINGS: FULL
OS_FINDINGS: FULL

## 2018-10-19 ENCOUNTER — DOCTOR'S OFFICE (OUTPATIENT)
Dept: URBAN - NONMETROPOLITAN AREA CLINIC 1 | Facility: CLINIC | Age: 83
Setting detail: OPHTHALMOLOGY
End: 2018-10-19
Payer: COMMERCIAL

## 2018-10-19 DIAGNOSIS — E11.3493: ICD-10-CM

## 2018-10-19 DIAGNOSIS — H43.812: ICD-10-CM

## 2018-10-19 DIAGNOSIS — H04.123: ICD-10-CM

## 2018-10-19 DIAGNOSIS — H43.813: ICD-10-CM

## 2018-10-19 DIAGNOSIS — H35.373: ICD-10-CM

## 2018-10-19 DIAGNOSIS — H43.811: ICD-10-CM

## 2018-10-19 PROCEDURE — 92226 OPHTHALMOSCOPY EXT SUBSEQUENT: CPT | Performed by: OPHTHALMOLOGY

## 2018-10-19 PROCEDURE — 92134 CPTRZ OPH DX IMG PST SGM RTA: CPT | Performed by: OPHTHALMOLOGY

## 2018-10-19 PROCEDURE — 92014 COMPRE OPH EXAM EST PT 1/>: CPT | Performed by: OPHTHALMOLOGY

## 2018-10-19 ASSESSMENT — REFRACTION_MANIFEST
OD_VA2: 20/
OU_VA: 20/
OD_VA1: 20/
OD_VA3: 20/
OD_VA2: 20/
OU_VA: 20/
OS_VA2: 20/
OD_VA1: 20/
OS_VA1: 20/
OD_VA3: 20/
OS_VA3: 20/
OS_VA3: 20/
OS_VA1: 20/
OS_VA2: 20/

## 2018-10-19 ASSESSMENT — REFRACTION_AUTOREFRACTION
OD_CYLINDER: -0.25
OS_CYLINDER: -0.50
OS_SPHERE: +0.00
OS_AXIS: 49
OD_AXIS: 98
OD_SPHERE: +0.25

## 2018-10-19 ASSESSMENT — LID POSITION - DERMATOCHALASIS
OS_DERMATOCHALASIS: LUL 2+
OD_DERMATOCHALASIS: RUL 2+

## 2018-10-19 ASSESSMENT — REFRACTION_CURRENTRX
OS_OVR_VA: 20/
OD_OVR_VA: 20/
OS_OVR_VA: 20/
OS_OVR_VA: 20/
OD_OVR_VA: 20/
OD_OVR_VA: 20/

## 2018-10-19 ASSESSMENT — SPHEQUIV_DERIVED
OS_SPHEQUIV: -0.25
OD_SPHEQUIV: 0.125

## 2018-10-19 ASSESSMENT — VISUAL ACUITY
OD_BCVA: 20/25+1
OS_BCVA: 20/20-1

## 2018-10-19 ASSESSMENT — PUNCTA - ASSESSMENT
OD_PUNCTA: 3MON PLUG RLL SMALL
OS_PUNCTA: SIL PLUG LLL SMALL

## 2018-10-19 ASSESSMENT — CONFRONTATIONAL VISUAL FIELD TEST (CVF)
OD_FINDINGS: FULL
OS_FINDINGS: FULL

## 2018-11-19 ENCOUNTER — DOCTOR'S OFFICE (OUTPATIENT)
Dept: URBAN - NONMETROPOLITAN AREA CLINIC 1 | Facility: CLINIC | Age: 83
Setting detail: OPHTHALMOLOGY
End: 2018-11-19
Payer: COMMERCIAL

## 2018-11-19 DIAGNOSIS — H43.811: ICD-10-CM

## 2018-11-19 DIAGNOSIS — H35.373: ICD-10-CM

## 2018-11-19 DIAGNOSIS — H04.122: ICD-10-CM

## 2018-11-19 DIAGNOSIS — H43.812: ICD-10-CM

## 2018-11-19 DIAGNOSIS — E11.3493: ICD-10-CM

## 2018-11-19 DIAGNOSIS — H04.123: ICD-10-CM

## 2018-11-19 PROCEDURE — 92014 COMPRE OPH EXAM EST PT 1/>: CPT | Performed by: OPHTHALMOLOGY

## 2018-11-19 PROCEDURE — 83861 MICROFLUID ANALY TEARS: CPT | Performed by: OPHTHALMOLOGY

## 2018-11-19 PROCEDURE — 92226 OPHTHALMOSCOPY EXT SUBSEQUENT: CPT | Performed by: OPHTHALMOLOGY

## 2018-11-19 PROCEDURE — 92134 CPTRZ OPH DX IMG PST SGM RTA: CPT | Performed by: OPHTHALMOLOGY

## 2018-11-19 ASSESSMENT — REFRACTION_AUTOREFRACTION
OD_CYLINDER: -0.25
OD_AXIS: 98
OS_AXIS: 49
OD_SPHERE: +0.25
OS_CYLINDER: -0.50
OS_SPHERE: +0.00

## 2018-11-19 ASSESSMENT — REFRACTION_MANIFEST
OD_VA1: 20/
OD_VA1: 20/
OS_VA1: 20/
OD_VA3: 20/
OU_VA: 20/
OD_VA3: 20/
OS_VA3: 20/
OD_VA2: 20/
OS_VA3: 20/
OS_VA1: 20/
OS_VA2: 20/
OS_VA2: 20/
OD_VA2: 20/
OU_VA: 20/

## 2018-11-19 ASSESSMENT — CONFRONTATIONAL VISUAL FIELD TEST (CVF)
OD_FINDINGS: FULL
OS_FINDINGS: FULL

## 2018-11-19 ASSESSMENT — REFRACTION_CURRENTRX
OS_OVR_VA: 20/
OS_OVR_VA: 20/
OD_OVR_VA: 20/
OD_OVR_VA: 20/
OS_OVR_VA: 20/
OD_OVR_VA: 20/

## 2018-11-19 ASSESSMENT — PUNCTA - ASSESSMENT
OS_PUNCTA: SIL PLUG LLL SMALL
OD_PUNCTA: 3MON PLUG RLL SMALL

## 2018-11-19 ASSESSMENT — VISUAL ACUITY
OD_BCVA: 20/20-2
OS_BCVA: 20/20

## 2018-11-19 ASSESSMENT — LID POSITION - DERMATOCHALASIS
OS_DERMATOCHALASIS: LUL 2+
OD_DERMATOCHALASIS: RUL 2+

## 2018-11-19 ASSESSMENT — SPHEQUIV_DERIVED
OD_SPHEQUIV: 0.125
OS_SPHEQUIV: -0.25

## 2018-12-07 ENCOUNTER — DOCTOR'S OFFICE (OUTPATIENT)
Dept: URBAN - NONMETROPOLITAN AREA CLINIC 1 | Facility: CLINIC | Age: 83
Setting detail: OPHTHALMOLOGY
End: 2018-12-07
Payer: COMMERCIAL

## 2018-12-07 DIAGNOSIS — H04.123: ICD-10-CM

## 2018-12-07 PROCEDURE — 68761 CLOSE TEAR DUCT OPENING: CPT | Performed by: OPHTHALMOLOGY

## 2018-12-07 ASSESSMENT — VISUAL ACUITY
OS_BCVA: 20/20
OD_BCVA: 20/20-2

## 2018-12-07 ASSESSMENT — REFRACTION_MANIFEST
OS_VA1: 20/
OS_VA2: 20/
OD_VA3: 20/
OD_VA2: 20/
OD_VA2: 20/
OS_VA3: 20/
OS_VA1: 20/
OU_VA: 20/
OD_VA1: 20/
OD_VA3: 20/
OS_VA2: 20/
OD_VA1: 20/
OU_VA: 20/
OS_VA3: 20/

## 2018-12-07 ASSESSMENT — REFRACTION_AUTOREFRACTION
OS_AXIS: 49
OS_SPHERE: +0.00
OS_CYLINDER: -0.50
OD_CYLINDER: -0.25
OD_SPHERE: +0.25
OD_AXIS: 98

## 2018-12-07 ASSESSMENT — REFRACTION_CURRENTRX
OS_OVR_VA: 20/
OS_OVR_VA: 20/
OD_OVR_VA: 20/
OS_OVR_VA: 20/
OD_OVR_VA: 20/
OD_OVR_VA: 20/

## 2018-12-07 ASSESSMENT — SPHEQUIV_DERIVED
OS_SPHEQUIV: -0.25
OD_SPHEQUIV: 0.125

## 2018-12-20 ENCOUNTER — DOCTOR'S OFFICE (OUTPATIENT)
Dept: URBAN - NONMETROPOLITAN AREA CLINIC 1 | Facility: CLINIC | Age: 83
Setting detail: OPHTHALMOLOGY
End: 2018-12-20
Payer: COMMERCIAL

## 2018-12-20 DIAGNOSIS — E11.3493: ICD-10-CM

## 2018-12-20 DIAGNOSIS — H35.373: ICD-10-CM

## 2018-12-20 DIAGNOSIS — H04.123: ICD-10-CM

## 2018-12-20 DIAGNOSIS — H43.812: ICD-10-CM

## 2018-12-20 DIAGNOSIS — H04.122: ICD-10-CM

## 2018-12-20 DIAGNOSIS — H43.811: ICD-10-CM

## 2018-12-20 PROCEDURE — 83861 MICROFLUID ANALY TEARS: CPT | Performed by: OPHTHALMOLOGY

## 2018-12-20 PROCEDURE — 92226 OPHTHALMOSCOPY EXT SUBSEQUENT: CPT | Performed by: OPHTHALMOLOGY

## 2018-12-20 PROCEDURE — 92134 CPTRZ OPH DX IMG PST SGM RTA: CPT | Performed by: OPHTHALMOLOGY

## 2018-12-20 PROCEDURE — 92014 COMPRE OPH EXAM EST PT 1/>: CPT | Performed by: OPHTHALMOLOGY

## 2018-12-20 ASSESSMENT — REFRACTION_CURRENTRX
OS_OVR_VA: 20/
OD_OVR_VA: 20/
OS_OVR_VA: 20/
OS_OVR_VA: 20/
OD_OVR_VA: 20/
OD_OVR_VA: 20/

## 2018-12-20 ASSESSMENT — REFRACTION_AUTOREFRACTION
OS_SPHERE: +0.00
OD_SPHERE: +0.25
OD_CYLINDER: -0.25
OS_AXIS: 49
OD_AXIS: 98
OS_CYLINDER: -0.50

## 2018-12-20 ASSESSMENT — LID POSITION - DERMATOCHALASIS
OS_DERMATOCHALASIS: LUL 2+
OD_DERMATOCHALASIS: RUL 2+

## 2018-12-20 ASSESSMENT — REFRACTION_MANIFEST
OD_VA1: 20/
OD_VA3: 20/
OD_VA1: 20/
OU_VA: 20/
OU_VA: 20/
OS_VA2: 20/
OD_VA2: 20/
OS_VA1: 20/
OS_VA3: 20/
OD_VA2: 20/
OD_VA3: 20/
OS_VA1: 20/
OS_VA3: 20/
OS_VA2: 20/

## 2018-12-20 ASSESSMENT — VISUAL ACUITY
OS_BCVA: 20/20-2
OD_BCVA: 20/25

## 2018-12-20 ASSESSMENT — PUNCTA - ASSESSMENT
OD_PUNCTA: 3MON PLUG RLL SMALL
OS_PUNCTA: SIL PLUG LLL SMALL

## 2018-12-20 ASSESSMENT — CONFRONTATIONAL VISUAL FIELD TEST (CVF)
OD_FINDINGS: FULL
OS_FINDINGS: FULL

## 2018-12-20 ASSESSMENT — SPHEQUIV_DERIVED
OD_SPHEQUIV: 0.125
OS_SPHEQUIV: -0.25

## 2019-01-18 ENCOUNTER — DOCTOR'S OFFICE (OUTPATIENT)
Dept: URBAN - NONMETROPOLITAN AREA CLINIC 1 | Facility: CLINIC | Age: 84
Setting detail: OPHTHALMOLOGY
End: 2019-01-18
Payer: COMMERCIAL

## 2019-01-18 DIAGNOSIS — H04.123: ICD-10-CM

## 2019-01-18 DIAGNOSIS — E11.3493: ICD-10-CM

## 2019-01-18 PROCEDURE — 92235 FLUORESCEIN ANGRPH MLTIFRAME: CPT | Performed by: OPHTHALMOLOGY

## 2019-01-18 PROCEDURE — 92250 FUNDUS PHOTOGRAPHY W/I&R: CPT | Performed by: OPHTHALMOLOGY

## 2019-01-18 PROCEDURE — 68761 CLOSE TEAR DUCT OPENING: CPT | Performed by: OPHTHALMOLOGY

## 2019-01-18 ASSESSMENT — PUNCTA - ASSESSMENT
OD_PUNCTA: 3MON PLUG RLL SMALL
OS_PUNCTA: SIL PLUG LLL SMALL

## 2019-01-18 ASSESSMENT — LID POSITION - DERMATOCHALASIS
OD_DERMATOCHALASIS: RUL 2+
OS_DERMATOCHALASIS: LUL 2+

## 2019-01-18 ASSESSMENT — REFRACTION_MANIFEST
OS_VA1: 20/
OD_VA3: 20/
OD_VA2: 20/
OS_VA3: 20/
OD_VA1: 20/
OD_VA1: 20/
OS_VA2: 20/
OD_VA2: 20/
OS_VA1: 20/
OS_VA3: 20/
OU_VA: 20/
OS_VA2: 20/
OD_VA3: 20/
OU_VA: 20/

## 2019-01-18 ASSESSMENT — REFRACTION_AUTOREFRACTION
OD_AXIS: 98
OS_CYLINDER: -0.50
OD_SPHERE: +0.25
OS_SPHERE: +0.00
OD_CYLINDER: -0.25
OS_AXIS: 49

## 2019-01-18 ASSESSMENT — REFRACTION_CURRENTRX
OS_OVR_VA: 20/
OD_OVR_VA: 20/
OS_OVR_VA: 20/
OS_OVR_VA: 20/

## 2019-01-18 ASSESSMENT — SPHEQUIV_DERIVED
OD_SPHEQUIV: 0.125
OS_SPHEQUIV: -0.25

## 2019-01-18 ASSESSMENT — VISUAL ACUITY
OS_BCVA: 20/20-2
OD_BCVA: 20/25+2

## 2019-02-07 ENCOUNTER — DOCTOR'S OFFICE (OUTPATIENT)
Dept: URBAN - NONMETROPOLITAN AREA CLINIC 1 | Facility: CLINIC | Age: 84
Setting detail: OPHTHALMOLOGY
End: 2019-02-07
Payer: COMMERCIAL

## 2019-02-07 DIAGNOSIS — H04.123: ICD-10-CM

## 2019-02-07 DIAGNOSIS — E11.3493: ICD-10-CM

## 2019-02-07 DIAGNOSIS — H04.122: ICD-10-CM

## 2019-02-07 DIAGNOSIS — H35.373: ICD-10-CM

## 2019-02-07 PROCEDURE — 92014 COMPRE OPH EXAM EST PT 1/>: CPT | Performed by: OPHTHALMOLOGY

## 2019-02-07 PROCEDURE — 83861 MICROFLUID ANALY TEARS: CPT | Performed by: OPHTHALMOLOGY

## 2019-02-07 PROCEDURE — 92134 CPTRZ OPH DX IMG PST SGM RTA: CPT | Performed by: OPHTHALMOLOGY

## 2019-02-07 ASSESSMENT — REFRACTION_AUTOREFRACTION
OS_AXIS: 49
OD_SPHERE: +0.25
OS_SPHERE: +0.00
OD_AXIS: 98
OS_CYLINDER: -0.50
OD_CYLINDER: -0.25

## 2019-02-07 ASSESSMENT — REFRACTION_MANIFEST
OS_VA2: 20/
OU_VA: 20/
OD_VA2: 20/
OS_VA3: 20/
OU_VA: 20/
OS_VA2: 20/
OS_VA1: 20/
OD_VA1: 20/
OD_VA2: 20/
OD_VA1: 20/
OS_VA3: 20/
OD_VA3: 20/
OD_VA3: 20/
OS_VA1: 20/

## 2019-02-07 ASSESSMENT — VISUAL ACUITY
OS_BCVA: 20/20-2
OD_BCVA: 20/20-1

## 2019-02-07 ASSESSMENT — SPHEQUIV_DERIVED
OS_SPHEQUIV: -0.25
OD_SPHEQUIV: 0.125

## 2019-02-07 ASSESSMENT — REFRACTION_CURRENTRX
OS_OVR_VA: 20/
OD_OVR_VA: 20/
OS_OVR_VA: 20/
OD_OVR_VA: 20/
OD_OVR_VA: 20/
OS_OVR_VA: 20/

## 2019-02-07 ASSESSMENT — PUNCTA - ASSESSMENT
OS_PUNCTA: SIL PLUG LLL SMALL
OD_PUNCTA: 3MON PLUG RLL SMALL

## 2019-02-07 ASSESSMENT — CONFRONTATIONAL VISUAL FIELD TEST (CVF)
OS_FINDINGS: FULL
OD_FINDINGS: FULL

## 2019-02-07 ASSESSMENT — LID POSITION - DERMATOCHALASIS
OD_DERMATOCHALASIS: RUL 2+
OS_DERMATOCHALASIS: LUL 2+

## 2019-03-11 ENCOUNTER — DOCTOR'S OFFICE (OUTPATIENT)
Dept: URBAN - NONMETROPOLITAN AREA CLINIC 1 | Facility: CLINIC | Age: 84
Setting detail: OPHTHALMOLOGY
End: 2019-03-11
Payer: COMMERCIAL

## 2019-03-11 DIAGNOSIS — H04.121: ICD-10-CM

## 2019-03-11 DIAGNOSIS — H35.373: ICD-10-CM

## 2019-03-11 DIAGNOSIS — H04.123: ICD-10-CM

## 2019-03-11 DIAGNOSIS — E11.3492: ICD-10-CM

## 2019-03-11 DIAGNOSIS — H04.122: ICD-10-CM

## 2019-03-11 DIAGNOSIS — E11.3493: ICD-10-CM

## 2019-03-11 PROCEDURE — 83861 MICROFLUID ANALY TEARS: CPT | Performed by: OPHTHALMOLOGY

## 2019-03-11 PROCEDURE — 92014 COMPRE OPH EXAM EST PT 1/>: CPT | Performed by: OPHTHALMOLOGY

## 2019-03-11 PROCEDURE — 92226 OPHTHALMOSCOPY EXT SUBSEQUENT: CPT | Performed by: OPHTHALMOLOGY

## 2019-03-11 PROCEDURE — 92134 CPTRZ OPH DX IMG PST SGM RTA: CPT | Performed by: OPHTHALMOLOGY

## 2019-03-11 ASSESSMENT — REFRACTION_MANIFEST
OD_VA3: 20/
OU_VA: 20/
OD_VA2: 20/
OS_VA3: 20/
OS_VA1: 20/
OS_VA2: 20/
OD_VA1: 20/
OD_VA2: 20/
OS_VA1: 20/
OU_VA: 20/
OS_VA2: 20/
OD_VA3: 20/
OD_VA1: 20/
OS_VA3: 20/

## 2019-03-11 ASSESSMENT — REFRACTION_CURRENTRX
OS_OVR_VA: 20/
OD_OVR_VA: 20/
OS_OVR_VA: 20/
OD_OVR_VA: 20/
OD_OVR_VA: 20/
OS_OVR_VA: 20/

## 2019-03-11 ASSESSMENT — LID POSITION - DERMATOCHALASIS
OD_DERMATOCHALASIS: RUL 2+
OS_DERMATOCHALASIS: LUL 2+

## 2019-03-11 ASSESSMENT — REFRACTION_AUTOREFRACTION
OS_CYLINDER: -0.50
OD_CYLINDER: -0.25
OS_AXIS: 49
OD_AXIS: 98
OS_SPHERE: +0.00
OD_SPHERE: +0.25

## 2019-03-11 ASSESSMENT — CONFRONTATIONAL VISUAL FIELD TEST (CVF)
OS_FINDINGS: FULL
OD_FINDINGS: FULL

## 2019-03-11 ASSESSMENT — SPHEQUIV_DERIVED
OD_SPHEQUIV: 0.125
OS_SPHEQUIV: -0.25

## 2019-03-11 ASSESSMENT — VISUAL ACUITY
OD_BCVA: 20/20-1
OS_BCVA: 20/20-1

## 2019-03-11 ASSESSMENT — PUNCTA - ASSESSMENT
OD_PUNCTA: 3MON PLUG RLL SMALL
OS_PUNCTA: SIL PLUG LLL SMALL

## 2019-05-13 ENCOUNTER — OPTICAL OFFICE (OUTPATIENT)
Dept: URBAN - NONMETROPOLITAN AREA CLINIC 4 | Facility: CLINIC | Age: 84
Setting detail: OPHTHALMOLOGY
End: 2019-05-13

## 2019-05-13 ENCOUNTER — DOCTOR'S OFFICE (OUTPATIENT)
Dept: URBAN - NONMETROPOLITAN AREA CLINIC 1 | Facility: CLINIC | Age: 84
Setting detail: OPHTHALMOLOGY
End: 2019-05-13
Payer: COMMERCIAL

## 2019-05-13 DIAGNOSIS — E11.3493: ICD-10-CM

## 2019-05-13 DIAGNOSIS — H43.811: ICD-10-CM

## 2019-05-13 DIAGNOSIS — H52.7: ICD-10-CM

## 2019-05-13 DIAGNOSIS — H04.121: ICD-10-CM

## 2019-05-13 DIAGNOSIS — H43.812: ICD-10-CM

## 2019-05-13 DIAGNOSIS — H35.373: ICD-10-CM

## 2019-05-13 DIAGNOSIS — H04.123: ICD-10-CM

## 2019-05-13 DIAGNOSIS — H43.813: ICD-10-CM

## 2019-05-13 DIAGNOSIS — H04.122: ICD-10-CM

## 2019-05-13 PROCEDURE — 92134 CPTRZ OPH DX IMG PST SGM RTA: CPT | Performed by: OPHTHALMOLOGY

## 2019-05-13 PROCEDURE — 92226 OPHTHALMOSCOPY EXT SUBSEQUENT: CPT | Performed by: OPHTHALMOLOGY

## 2019-05-13 PROCEDURE — V2799T TAXABLE VISION SERVICE MISCELLANEOUS: Performed by: OPHTHALMOLOGY

## 2019-05-13 PROCEDURE — 83861 MICROFLUID ANALY TEARS: CPT | Performed by: OPHTHALMOLOGY

## 2019-05-13 PROCEDURE — 92014 COMPRE OPH EXAM EST PT 1/>: CPT | Performed by: OPHTHALMOLOGY

## 2019-05-13 ASSESSMENT — REFRACTION_MANIFEST
OS_VA1: 20/
OS_VA1: 20/
OU_VA: 20/
OD_VA3: 20/
OD_VA1: 20/
OD_VA1: 20/
OD_VA2: 20/
OS_VA2: 20/
OS_VA3: 20/
OU_VA: 20/
OD_VA2: 20/
OD_VA3: 20/
OS_VA2: 20/
OS_VA3: 20/

## 2019-05-13 ASSESSMENT — REFRACTION_CURRENTRX
OD_OVR_VA: 20/
OS_OVR_VA: 20/
OD_OVR_VA: 20/
OS_OVR_VA: 20/
OD_OVR_VA: 20/
OS_OVR_VA: 20/

## 2019-05-13 ASSESSMENT — REFRACTION_AUTOREFRACTION
OS_CYLINDER: -0.50
OS_SPHERE: +0.00
OD_CYLINDER: -0.25
OD_AXIS: 98
OD_SPHERE: +0.25
OS_AXIS: 49

## 2019-05-13 ASSESSMENT — PUNCTA - ASSESSMENT
OS_PUNCTA: SIL PLUG LLL SMALL
OD_PUNCTA: 3MON PLUG RLL SMALL

## 2019-05-13 ASSESSMENT — VISUAL ACUITY
OS_BCVA: 20/20-2
OD_BCVA: 20/20-1

## 2019-05-13 ASSESSMENT — LID POSITION - DERMATOCHALASIS
OS_DERMATOCHALASIS: LUL 2+
OD_DERMATOCHALASIS: RUL 2+

## 2019-05-13 ASSESSMENT — CONFRONTATIONAL VISUAL FIELD TEST (CVF)
OD_FINDINGS: FULL
OS_FINDINGS: FULL

## 2019-05-13 ASSESSMENT — SPHEQUIV_DERIVED
OD_SPHEQUIV: 0.125
OS_SPHEQUIV: -0.25

## 2019-05-31 ENCOUNTER — DOCTOR'S OFFICE (OUTPATIENT)
Dept: URBAN - NONMETROPOLITAN AREA CLINIC 1 | Facility: CLINIC | Age: 84
Setting detail: OPHTHALMOLOGY
End: 2019-05-31
Payer: COMMERCIAL

## 2019-05-31 DIAGNOSIS — H04.122: ICD-10-CM

## 2019-05-31 DIAGNOSIS — H04.121: ICD-10-CM

## 2019-05-31 PROCEDURE — 68761 CLOSE TEAR DUCT OPENING: CPT | Performed by: OPHTHALMOLOGY

## 2019-05-31 ASSESSMENT — REFRACTION_AUTOREFRACTION
OD_AXIS: 98
OS_AXIS: 49
OD_CYLINDER: -0.25
OS_SPHERE: +0.00
OS_CYLINDER: -0.50
OD_SPHERE: +0.25

## 2019-05-31 ASSESSMENT — VISUAL ACUITY
OS_BCVA: 20/20-2
OD_BCVA: 20/20-1

## 2019-05-31 ASSESSMENT — REFRACTION_MANIFEST
OD_VA3: 20/
OD_VA2: 20/
OS_VA1: 20/
OU_VA: 20/
OS_VA3: 20/
OS_VA2: 20/
OU_VA: 20/
OD_VA1: 20/
OS_VA3: 20/
OD_VA2: 20/
OS_VA2: 20/
OD_VA1: 20/
OD_VA3: 20/
OS_VA1: 20/

## 2019-05-31 ASSESSMENT — REFRACTION_CURRENTRX
OS_OVR_VA: 20/
OS_OVR_VA: 20/
OD_OVR_VA: 20/
OS_OVR_VA: 20/

## 2019-05-31 ASSESSMENT — SPHEQUIV_DERIVED
OS_SPHEQUIV: -0.25
OD_SPHEQUIV: 0.125

## 2019-06-10 ENCOUNTER — DOCTOR'S OFFICE (OUTPATIENT)
Dept: URBAN - NONMETROPOLITAN AREA CLINIC 1 | Facility: CLINIC | Age: 84
Setting detail: OPHTHALMOLOGY
End: 2019-06-10
Payer: COMMERCIAL

## 2019-06-10 DIAGNOSIS — H43.812: ICD-10-CM

## 2019-06-10 DIAGNOSIS — H35.373: ICD-10-CM

## 2019-06-10 DIAGNOSIS — H43.813: ICD-10-CM

## 2019-06-10 DIAGNOSIS — H04.121: ICD-10-CM

## 2019-06-10 DIAGNOSIS — E11.3493: ICD-10-CM

## 2019-06-10 DIAGNOSIS — H04.123: ICD-10-CM

## 2019-06-10 DIAGNOSIS — H04.122: ICD-10-CM

## 2019-06-10 DIAGNOSIS — H43.811: ICD-10-CM

## 2019-06-10 PROCEDURE — 92014 COMPRE OPH EXAM EST PT 1/>: CPT | Performed by: OPHTHALMOLOGY

## 2019-06-10 PROCEDURE — 92226 OPHTHALMOSCOPY EXT SUBSEQUENT: CPT | Performed by: OPHTHALMOLOGY

## 2019-06-10 PROCEDURE — 83861 MICROFLUID ANALY TEARS: CPT | Performed by: OPHTHALMOLOGY

## 2019-06-10 PROCEDURE — 92134 CPTRZ OPH DX IMG PST SGM RTA: CPT | Performed by: OPHTHALMOLOGY

## 2019-06-10 ASSESSMENT — REFRACTION_AUTOREFRACTION
OD_AXIS: 98
OS_CYLINDER: -0.50
OS_SPHERE: +0.00
OS_AXIS: 49
OD_SPHERE: +0.25
OD_CYLINDER: -0.25

## 2019-06-10 ASSESSMENT — REFRACTION_MANIFEST
OD_VA1: 20/
OS_VA1: 20/
OD_VA2: 20/
OS_VA3: 20/
OS_VA1: 20/
OD_VA2: 20/
OU_VA: 20/
OU_VA: 20/
OD_VA1: 20/
OS_VA2: 20/
OS_VA2: 20/
OS_VA3: 20/
OD_VA3: 20/
OD_VA3: 20/

## 2019-06-10 ASSESSMENT — CONFRONTATIONAL VISUAL FIELD TEST (CVF)
OS_FINDINGS: FULL
OD_FINDINGS: FULL

## 2019-06-10 ASSESSMENT — PUNCTA - ASSESSMENT
OS_PUNCTA: SIL PLUG LLL SMALL
OD_PUNCTA: 3MON PLUG RLL SMALL

## 2019-06-10 ASSESSMENT — VISUAL ACUITY
OS_BCVA: 20/20
OD_BCVA: 20/20-2

## 2019-06-10 ASSESSMENT — LID POSITION - DERMATOCHALASIS
OS_DERMATOCHALASIS: LUL 2+
OD_DERMATOCHALASIS: RUL 2+

## 2019-06-10 ASSESSMENT — SPHEQUIV_DERIVED
OD_SPHEQUIV: 0.125
OS_SPHEQUIV: -0.25

## 2019-06-10 ASSESSMENT — REFRACTION_CURRENTRX
OD_OVR_VA: 20/
OS_OVR_VA: 20/
OD_OVR_VA: 20/
OD_OVR_VA: 20/
OS_OVR_VA: 20/
OS_OVR_VA: 20/

## 2019-07-11 ENCOUNTER — DOCTOR'S OFFICE (OUTPATIENT)
Dept: URBAN - NONMETROPOLITAN AREA CLINIC 1 | Facility: CLINIC | Age: 84
Setting detail: OPHTHALMOLOGY
End: 2019-07-11
Payer: COMMERCIAL

## 2019-07-11 ENCOUNTER — RX ONLY (RX ONLY)
Age: 84
End: 2019-07-11

## 2019-07-11 DIAGNOSIS — H43.811: ICD-10-CM

## 2019-07-11 DIAGNOSIS — H43.812: ICD-10-CM

## 2019-07-11 DIAGNOSIS — E11.3491: ICD-10-CM

## 2019-07-11 DIAGNOSIS — H04.123: ICD-10-CM

## 2019-07-11 DIAGNOSIS — E11.3492: ICD-10-CM

## 2019-07-11 DIAGNOSIS — H04.121: ICD-10-CM

## 2019-07-11 DIAGNOSIS — H04.122: ICD-10-CM

## 2019-07-11 DIAGNOSIS — H35.373: ICD-10-CM

## 2019-07-11 PROCEDURE — 83861 MICROFLUID ANALY TEARS: CPT | Performed by: OPHTHALMOLOGY

## 2019-07-11 PROCEDURE — 92014 COMPRE OPH EXAM EST PT 1/>: CPT | Performed by: OPHTHALMOLOGY

## 2019-07-11 PROCEDURE — 92134 CPTRZ OPH DX IMG PST SGM RTA: CPT | Performed by: OPHTHALMOLOGY

## 2019-07-11 ASSESSMENT — LID POSITION - DERMATOCHALASIS
OD_DERMATOCHALASIS: RUL 2+
OS_DERMATOCHALASIS: LUL 2+

## 2019-07-11 ASSESSMENT — VISUAL ACUITY
OS_BCVA: 20/20
OD_BCVA: 20/20-2

## 2019-07-11 ASSESSMENT — REFRACTION_AUTOREFRACTION
OD_SPHERE: +0.25
OS_SPHERE: +0.00
OD_AXIS: 98
OD_CYLINDER: -0.25
OS_CYLINDER: -0.50
OS_AXIS: 49

## 2019-07-11 ASSESSMENT — REFRACTION_CURRENTRX
OD_OVR_VA: 20/
OS_OVR_VA: 20/
OD_OVR_VA: 20/
OD_OVR_VA: 20/

## 2019-07-11 ASSESSMENT — REFRACTION_MANIFEST
OS_VA2: 20/
OD_VA1: 20/
OD_VA3: 20/
OS_VA2: 20/
OU_VA: 20/
OS_VA1: 20/
OD_VA1: 20/
OU_VA: 20/
OD_VA2: 20/
OS_VA1: 20/
OS_VA3: 20/
OD_VA2: 20/
OS_VA3: 20/
OD_VA3: 20/

## 2019-07-11 ASSESSMENT — CONFRONTATIONAL VISUAL FIELD TEST (CVF)
OS_FINDINGS: FULL
OD_FINDINGS: FULL

## 2019-07-11 ASSESSMENT — SPHEQUIV_DERIVED
OD_SPHEQUIV: 0.125
OS_SPHEQUIV: -0.25

## 2019-07-11 ASSESSMENT — PUNCTA - ASSESSMENT
OD_PUNCTA: 3MON PLUG RLL SMALL
OS_PUNCTA: SIL PLUG LLL SMALL

## 2019-08-15 ENCOUNTER — DOCTOR'S OFFICE (OUTPATIENT)
Dept: URBAN - NONMETROPOLITAN AREA CLINIC 1 | Facility: CLINIC | Age: 84
Setting detail: OPHTHALMOLOGY
End: 2019-08-15
Payer: COMMERCIAL

## 2019-08-15 DIAGNOSIS — H04.122: ICD-10-CM

## 2019-08-15 DIAGNOSIS — H04.123: ICD-10-CM

## 2019-08-15 DIAGNOSIS — H43.813: ICD-10-CM

## 2019-08-15 DIAGNOSIS — E11.3493: ICD-10-CM

## 2019-08-15 DIAGNOSIS — H35.373: ICD-10-CM

## 2019-08-15 PROCEDURE — 83861 MICROFLUID ANALY TEARS: CPT | Performed by: OPHTHALMOLOGY

## 2019-08-15 PROCEDURE — 92014 COMPRE OPH EXAM EST PT 1/>: CPT | Performed by: OPHTHALMOLOGY

## 2019-08-15 PROCEDURE — 92134 CPTRZ OPH DX IMG PST SGM RTA: CPT | Performed by: OPHTHALMOLOGY

## 2019-08-15 ASSESSMENT — REFRACTION_CURRENTRX
OS_OVR_VA: 20/
OD_OVR_VA: 20/
OD_OVR_VA: 20/
OS_OVR_VA: 20/
OD_OVR_VA: 20/
OS_OVR_VA: 20/

## 2019-08-15 ASSESSMENT — REFRACTION_MANIFEST
OS_VA1: 20/
OS_VA3: 20/
OS_VA1: 20/
OD_VA1: 20/
OS_VA2: 20/
OD_VA2: 20/
OD_VA3: 20/
OD_VA1: 20/
OS_VA2: 20/
OS_VA3: 20/
OU_VA: 20/
OD_VA3: 20/
OD_VA2: 20/
OU_VA: 20/

## 2019-08-15 ASSESSMENT — REFRACTION_AUTOREFRACTION
OS_AXIS: 49
OS_CYLINDER: -0.50
OS_SPHERE: +0.00
OD_AXIS: 98
OD_SPHERE: +0.25
OD_CYLINDER: -0.25

## 2019-08-15 ASSESSMENT — CONFRONTATIONAL VISUAL FIELD TEST (CVF)
OD_FINDINGS: FULL
OS_FINDINGS: FULL

## 2019-08-15 ASSESSMENT — LID POSITION - DERMATOCHALASIS
OD_DERMATOCHALASIS: RUL 2+
OS_DERMATOCHALASIS: LUL 2+

## 2019-08-15 ASSESSMENT — VISUAL ACUITY
OS_BCVA: 20/15-2
OD_BCVA: 20/20-1

## 2019-08-15 ASSESSMENT — PUNCTA - ASSESSMENT
OS_PUNCTA: SIL PLUG LLL SMALL
OD_PUNCTA: 3MON PLUG RLL SMALL

## 2019-08-15 ASSESSMENT — SPHEQUIV_DERIVED
OS_SPHEQUIV: -0.25
OD_SPHEQUIV: 0.125

## 2019-10-17 ENCOUNTER — DOCTOR'S OFFICE (OUTPATIENT)
Dept: URBAN - NONMETROPOLITAN AREA CLINIC 1 | Facility: CLINIC | Age: 84
Setting detail: OPHTHALMOLOGY
End: 2019-10-17
Payer: COMMERCIAL

## 2019-10-17 DIAGNOSIS — E11.3293: ICD-10-CM

## 2019-10-17 DIAGNOSIS — H04.122: ICD-10-CM

## 2019-10-17 DIAGNOSIS — H35.373: ICD-10-CM

## 2019-10-17 DIAGNOSIS — H43.812: ICD-10-CM

## 2019-10-17 DIAGNOSIS — H43.811: ICD-10-CM

## 2019-10-17 DIAGNOSIS — H04.123: ICD-10-CM

## 2019-10-17 DIAGNOSIS — H43.813: ICD-10-CM

## 2019-10-17 PROCEDURE — 83861 MICROFLUID ANALY TEARS: CPT | Performed by: OPHTHALMOLOGY

## 2019-10-17 PROCEDURE — 92226 OPHTHALMOSCOPY EXT SUBSEQUENT: CPT | Performed by: OPHTHALMOLOGY

## 2019-10-17 PROCEDURE — 92014 COMPRE OPH EXAM EST PT 1/>: CPT | Performed by: OPHTHALMOLOGY

## 2019-10-17 PROCEDURE — 92134 CPTRZ OPH DX IMG PST SGM RTA: CPT | Performed by: OPHTHALMOLOGY

## 2019-10-17 ASSESSMENT — CONFRONTATIONAL VISUAL FIELD TEST (CVF)
OD_FINDINGS: FULL
OS_FINDINGS: FULL

## 2019-10-17 ASSESSMENT — LID POSITION - DERMATOCHALASIS
OS_DERMATOCHALASIS: LUL 2+
OD_DERMATOCHALASIS: RUL 2+

## 2019-10-17 ASSESSMENT — PUNCTA - ASSESSMENT
OS_PUNCTA: SIL PLUG LLL SMALL
OD_PUNCTA: 3MON PLUG RLL SMALL

## 2019-10-28 ASSESSMENT — REFRACTION_AUTOREFRACTION
OD_AXIS: 98
OS_SPHERE: +0.00
OD_SPHERE: +0.25
OD_CYLINDER: -0.25
OS_CYLINDER: -0.50
OS_AXIS: 49

## 2019-10-28 ASSESSMENT — SPHEQUIV_DERIVED
OS_SPHEQUIV: -0.25
OD_SPHEQUIV: 0.125

## 2019-10-28 ASSESSMENT — REFRACTION_MANIFEST
OU_VA: 20/
OU_VA: 20/
OS_VA1: 20/
OD_VA3: 20/
OS_VA2: 20/
OD_VA3: 20/
OS_VA2: 20/
OD_VA2: 20/
OS_VA1: 20/
OD_VA1: 20/
OS_VA3: 20/
OD_VA1: 20/
OD_VA2: 20/
OS_VA3: 20/

## 2019-10-28 ASSESSMENT — REFRACTION_CURRENTRX
OD_OVR_VA: 20/
OD_OVR_VA: 20/
OS_OVR_VA: 20/
OD_OVR_VA: 20/

## 2019-10-28 ASSESSMENT — VISUAL ACUITY
OS_BCVA: 20/15-2
OD_BCVA: 20/25-1

## 2019-11-21 ENCOUNTER — RX ONLY (RX ONLY)
Age: 84
End: 2019-11-21

## 2019-11-21 ENCOUNTER — DOCTOR'S OFFICE (OUTPATIENT)
Dept: URBAN - NONMETROPOLITAN AREA CLINIC 1 | Facility: CLINIC | Age: 84
Setting detail: OPHTHALMOLOGY
End: 2019-11-21
Payer: COMMERCIAL

## 2019-11-21 DIAGNOSIS — H43.813: ICD-10-CM

## 2019-11-21 DIAGNOSIS — H04.121: ICD-10-CM

## 2019-11-21 DIAGNOSIS — E11.3293: ICD-10-CM

## 2019-11-21 DIAGNOSIS — H35.373: ICD-10-CM

## 2019-11-21 DIAGNOSIS — H04.123: ICD-10-CM

## 2019-11-21 DIAGNOSIS — H04.122: ICD-10-CM

## 2019-11-21 PROCEDURE — 83861 MICROFLUID ANALY TEARS: CPT | Performed by: OPHTHALMOLOGY

## 2019-11-21 PROCEDURE — 92014 COMPRE OPH EXAM EST PT 1/>: CPT | Performed by: OPHTHALMOLOGY

## 2019-11-21 PROCEDURE — 92134 CPTRZ OPH DX IMG PST SGM RTA: CPT | Performed by: OPHTHALMOLOGY

## 2019-11-21 ASSESSMENT — REFRACTION_CURRENTRX
OS_OVR_VA: 20/
OD_OVR_VA: 20/
OS_OVR_VA: 20/
OD_OVR_VA: 20/
OS_OVR_VA: 20/
OD_OVR_VA: 20/

## 2019-11-21 ASSESSMENT — REFRACTION_MANIFEST
OS_VA2: 20/
OD_VA1: 20/
OD_VA3: 20/
OD_VA2: 20/
OS_VA3: 20/
OS_VA3: 20/
OD_VA3: 20/
OU_VA: 20/
OS_VA2: 20/
OS_VA1: 20/
OD_VA2: 20/
OS_VA1: 20/
OU_VA: 20/
OD_VA1: 20/

## 2019-11-21 ASSESSMENT — CONFRONTATIONAL VISUAL FIELD TEST (CVF)
OD_FINDINGS: FULL
OS_FINDINGS: FULL

## 2019-11-21 ASSESSMENT — REFRACTION_AUTOREFRACTION
OS_CYLINDER: -0.50
OD_AXIS: 98
OD_CYLINDER: -0.25
OS_SPHERE: +0.00
OD_SPHERE: +0.25
OS_AXIS: 49

## 2019-11-21 ASSESSMENT — PUNCTA - ASSESSMENT
OD_PUNCTA: 3MON PLUG RLL SMALL
OS_PUNCTA: SIL PLUG LLL SMALL

## 2019-11-21 ASSESSMENT — LID POSITION - DERMATOCHALASIS
OS_DERMATOCHALASIS: LUL 2+
OD_DERMATOCHALASIS: RUL 2+

## 2019-11-21 ASSESSMENT — SPHEQUIV_DERIVED
OS_SPHEQUIV: -0.25
OD_SPHEQUIV: 0.125

## 2019-11-21 ASSESSMENT — VISUAL ACUITY
OS_BCVA: 20/20-1
OD_BCVA: 20/25-2

## 2020-01-31 ENCOUNTER — DOCTOR'S OFFICE (OUTPATIENT)
Dept: URBAN - NONMETROPOLITAN AREA CLINIC 1 | Facility: CLINIC | Age: 85
Setting detail: OPHTHALMOLOGY
End: 2020-01-31
Payer: COMMERCIAL

## 2020-01-31 VITALS — HEIGHT: 55 IN

## 2020-01-31 DIAGNOSIS — E11.3293: ICD-10-CM

## 2020-01-31 DIAGNOSIS — H35.373: ICD-10-CM

## 2020-01-31 DIAGNOSIS — H04.123: ICD-10-CM

## 2020-01-31 DIAGNOSIS — H43.813: ICD-10-CM

## 2020-01-31 PROCEDURE — 92201 OPSCPY EXTND RTA DRAW UNI/BI: CPT | Performed by: OPHTHALMOLOGY

## 2020-01-31 PROCEDURE — 92014 COMPRE OPH EXAM EST PT 1/>: CPT | Performed by: OPHTHALMOLOGY

## 2020-01-31 PROCEDURE — 92134 CPTRZ OPH DX IMG PST SGM RTA: CPT | Performed by: OPHTHALMOLOGY

## 2020-01-31 ASSESSMENT — PUNCTA - ASSESSMENT
OS_PUNCTA: SIL PLUG LLL SMALL
OD_PUNCTA: 3MON PLUG RLL SMALL

## 2020-01-31 ASSESSMENT — VISUAL ACUITY
OD_BCVA: 20/25-1
OS_BCVA: 20/25

## 2020-01-31 ASSESSMENT — REFRACTION_AUTOREFRACTION
OS_CYLINDER: -0.50
OD_CYLINDER: -0.25
OS_SPHERE: +0.00
OD_AXIS: 98
OS_AXIS: 49
OD_SPHERE: +0.25

## 2020-01-31 ASSESSMENT — SPHEQUIV_DERIVED
OS_SPHEQUIV: -0.25
OD_SPHEQUIV: 0.125

## 2020-01-31 ASSESSMENT — LID POSITION - DERMATOCHALASIS
OS_DERMATOCHALASIS: LUL 2+
OD_DERMATOCHALASIS: RUL 2+

## 2020-01-31 ASSESSMENT — CONFRONTATIONAL VISUAL FIELD TEST (CVF)
OS_FINDINGS: FULL
OD_FINDINGS: FULL

## 2020-02-10 ENCOUNTER — DOCTOR'S OFFICE (OUTPATIENT)
Dept: URBAN - NONMETROPOLITAN AREA CLINIC 1 | Facility: CLINIC | Age: 85
Setting detail: OPHTHALMOLOGY
End: 2020-02-10
Payer: COMMERCIAL

## 2020-02-10 DIAGNOSIS — E11.3293: ICD-10-CM

## 2020-02-10 DIAGNOSIS — H04.123: ICD-10-CM

## 2020-02-10 DIAGNOSIS — H35.373: ICD-10-CM

## 2020-02-10 DIAGNOSIS — H43.813: ICD-10-CM

## 2020-02-10 PROBLEM — H16.001 CORNEAL ULCER; RIGHT EYE: Status: RESOLVED | Noted: 2017-03-31 | Resolved: 2020-02-10

## 2020-02-10 PROCEDURE — 92250 FUNDUS PHOTOGRAPHY W/I&R: CPT | Performed by: OPHTHALMOLOGY

## 2020-02-10 PROCEDURE — 92235 FLUORESCEIN ANGRPH MLTIFRAME: CPT | Performed by: OPHTHALMOLOGY

## 2020-02-10 PROCEDURE — 92014 COMPRE OPH EXAM EST PT 1/>: CPT | Performed by: OPHTHALMOLOGY

## 2020-02-10 ASSESSMENT — REFRACTION_AUTOREFRACTION
OD_SPHERE: +0.25
OD_AXIS: 98
OS_SPHERE: +0.00
OS_AXIS: 49
OD_CYLINDER: -0.25
OS_CYLINDER: -0.50

## 2020-02-10 ASSESSMENT — CONFRONTATIONAL VISUAL FIELD TEST (CVF)
OS_FINDINGS: FULL
OD_FINDINGS: FULL

## 2020-02-10 ASSESSMENT — LID POSITION - DERMATOCHALASIS
OD_DERMATOCHALASIS: RUL 2+
OS_DERMATOCHALASIS: LUL 2+

## 2020-02-10 ASSESSMENT — VISUAL ACUITY
OD_BCVA: 20/25-1
OS_BCVA: 20/25

## 2020-02-10 ASSESSMENT — PUNCTA - ASSESSMENT
OD_PUNCTA: 3MON PLUG RLL SMALL
OS_PUNCTA: SIL PLUG LLL SMALL

## 2020-02-10 ASSESSMENT — SPHEQUIV_DERIVED
OD_SPHEQUIV: 0.125
OS_SPHEQUIV: -0.25

## 2020-02-14 ENCOUNTER — RX ONLY (RX ONLY)
Age: 85
End: 2020-02-14

## 2020-02-14 ENCOUNTER — DOCTOR'S OFFICE (OUTPATIENT)
Dept: URBAN - NONMETROPOLITAN AREA CLINIC 1 | Facility: CLINIC | Age: 85
Setting detail: OPHTHALMOLOGY
End: 2020-02-14
Payer: COMMERCIAL

## 2020-02-14 VITALS — HEIGHT: 55 IN

## 2020-02-14 DIAGNOSIS — H27.8: ICD-10-CM

## 2020-02-14 DIAGNOSIS — H52.4: ICD-10-CM

## 2020-02-14 PROCEDURE — 92015 DETERMINE REFRACTIVE STATE: CPT | Performed by: OPTOMETRIST

## 2020-02-14 ASSESSMENT — VISUAL ACUITY
OS_BCVA: 20/20
OD_BCVA: 20/25+1

## 2020-02-14 ASSESSMENT — REFRACTION_MANIFEST
OD_SPHERE: PLANO
OS_VA2: 20/20
OD_VA2: 20/20
OS_ADD: +2.50
OD_AXIS: 095
OS_SPHERE: PLANO
OS_AXIS: 030
OS_CYLINDER: -0.50
OD_CYLINDER: -0.25
OD_ADD: +2.50
OD_VA1: 20/20
OS_VA1: 20/20

## 2020-02-14 ASSESSMENT — REFRACTION_CURRENTRX
OS_OVR_VA: 20/
OD_CYLINDER: -0.25
OD_VPRISM_DIRECTION: BF
OS_SPHERE: PLANO
OD_SPHERE: PLANO
OS_VPRISM_DIRECTION: BF
OD_ADD: +2.75
OD_AXIS: 93
OS_CYLINDER: -0.50
OS_ADD: +2.75
OS_AXIS: 29
OD_OVR_VA: 20/

## 2020-02-14 ASSESSMENT — REFRACTION_AUTOREFRACTION
OS_AXIS: 62
OS_SPHERE: +0.50
OD_CYLINDER: 0.00
OD_SPHERE: +0.25
OS_CYLINDER: -1.50

## 2020-02-14 ASSESSMENT — SPHEQUIV_DERIVED
OS_SPHEQUIV: -0.25
OD_SPHEQUIV: 0.25

## 2020-02-17 ENCOUNTER — OPTICAL OFFICE (OUTPATIENT)
Dept: URBAN - NONMETROPOLITAN AREA CLINIC 4 | Facility: CLINIC | Age: 85
Setting detail: OPHTHALMOLOGY
End: 2020-02-17
Payer: COMMERCIAL

## 2020-02-17 DIAGNOSIS — H52.223: ICD-10-CM

## 2020-02-17 PROCEDURE — V2750 ANTI-REFLECTIVE COATING: HCPCS | Performed by: OPTOMETRIST

## 2020-02-17 PROCEDURE — V2203 LENS SPHCYL BIFOCAL 4.00D/.1: HCPCS | Performed by: OPTOMETRIST

## 2020-02-17 PROCEDURE — V2020 VISION SVCS FRAMES PURCHASES: HCPCS | Performed by: OPTOMETRIST

## 2020-02-17 PROCEDURE — V2744 TINT PHOTOCHROMATIC LENS/ES: HCPCS | Performed by: OPTOMETRIST

## 2020-05-14 ENCOUNTER — DOCTOR'S OFFICE (OUTPATIENT)
Dept: URBAN - NONMETROPOLITAN AREA CLINIC 1 | Facility: CLINIC | Age: 85
Setting detail: OPHTHALMOLOGY
End: 2020-05-14
Payer: COMMERCIAL

## 2020-05-14 VITALS — HEIGHT: 55 IN

## 2020-05-14 DIAGNOSIS — E11.3293: ICD-10-CM

## 2020-05-14 DIAGNOSIS — H35.373: ICD-10-CM

## 2020-05-14 DIAGNOSIS — H04.121: ICD-10-CM

## 2020-05-14 DIAGNOSIS — H43.813: ICD-10-CM

## 2020-05-14 DIAGNOSIS — H04.122: ICD-10-CM

## 2020-05-14 PROCEDURE — 92014 COMPRE OPH EXAM EST PT 1/>: CPT | Performed by: OPHTHALMOLOGY

## 2020-05-14 PROCEDURE — 92134 CPTRZ OPH DX IMG PST SGM RTA: CPT | Performed by: OPHTHALMOLOGY

## 2020-05-14 PROCEDURE — 83861 MICROFLUID ANALY TEARS: CPT | Performed by: OPHTHALMOLOGY

## 2020-05-14 PROCEDURE — 92201 OPSCPY EXTND RTA DRAW UNI/BI: CPT | Performed by: OPHTHALMOLOGY

## 2020-05-14 ASSESSMENT — REFRACTION_CURRENTRX
OD_VPRISM_DIRECTION: BF
OD_AXIS: 93
OS_ADD: +2.75
OS_SPHERE: PLANO
OD_OVR_VA: 20/
OS_VPRISM_DIRECTION: BF
OS_OVR_VA: 20/
OS_CYLINDER: -0.50
OD_ADD: +2.75
OD_SPHERE: PLANO
OS_AXIS: 29
OD_CYLINDER: -0.25

## 2020-05-14 ASSESSMENT — REFRACTION_MANIFEST
OS_VA2: 20/20
OD_VA1: 20/20
OS_AXIS: 030
OD_ADD: +2.50
OS_SPHERE: PLANO
OS_CYLINDER: -0.50
OD_AXIS: 095
OS_ADD: +2.50
OD_VA2: 20/20
OD_SPHERE: PLANO
OD_CYLINDER: -0.25
OS_VA1: 20/20

## 2020-05-14 ASSESSMENT — VISUAL ACUITY
OS_BCVA: 20/20-2
OD_BCVA: 20/20-2

## 2020-05-14 ASSESSMENT — REFRACTION_AUTOREFRACTION
OD_SPHERE: +0.25
OS_SPHERE: +0.50
OD_CYLINDER: 0.00
OS_CYLINDER: -1.50
OS_AXIS: 62

## 2020-05-14 ASSESSMENT — LID POSITION - DERMATOCHALASIS
OD_DERMATOCHALASIS: RUL 2+
OS_DERMATOCHALASIS: LUL 2+

## 2020-05-14 ASSESSMENT — CONFRONTATIONAL VISUAL FIELD TEST (CVF)
OD_FINDINGS: FULL
OS_FINDINGS: FULL

## 2020-05-14 ASSESSMENT — SPHEQUIV_DERIVED
OD_SPHEQUIV: 0.25
OS_SPHEQUIV: -0.25

## 2020-05-14 ASSESSMENT — SUPERFICIAL PUNCTATE KERATITIS (SPK)
OS_SPK: 1+
OD_SPK: 1+

## 2020-05-14 ASSESSMENT — PUNCTA - ASSESSMENT
OD_PUNCTA: 3MON PLUG RLL SMALL
OS_PUNCTA: SIL PLUG LLL SMALL

## 2020-08-02 ENCOUNTER — HOSPITAL ENCOUNTER (EMERGENCY)
Facility: HOSPITAL | Age: 85
Discharge: HOME | End: 2020-08-02
Attending: EMERGENCY MEDICINE | Admitting: EMERGENCY MEDICINE
Payer: COMMERCIAL

## 2020-08-02 VITALS
DIASTOLIC BLOOD PRESSURE: 80 MMHG | OXYGEN SATURATION: 98 % | TEMPERATURE: 97.8 F | SYSTOLIC BLOOD PRESSURE: 168 MMHG | RESPIRATION RATE: 18 BRPM | HEART RATE: 80 BPM | WEIGHT: 182.98 LBS

## 2020-08-02 DIAGNOSIS — M25.461 EFFUSION OF RIGHT KNEE: Primary | ICD-10-CM

## 2020-08-02 PROCEDURE — 99283 EMERGENCY DEPT VISIT LOW MDM: CPT

## 2020-08-02 PROCEDURE — 20610 DRAIN/INJ JOINT/BURSA W/O US: CPT | Performed by: EMERGENCY MEDICINE

## 2020-08-02 PROCEDURE — 99284 EMERGENCY DEPT VISIT MOD MDM: CPT | Performed by: EMERGENCY MEDICINE

## 2020-08-02 RX ORDER — GLIPIZIDE 2.5 MG/1
TABLET, EXTENDED RELEASE ORAL
COMMUNITY
Start: 2017-08-03

## 2020-08-02 RX ORDER — CARVEDILOL 3.12 MG/1
TABLET ORAL
COMMUNITY
Start: 2018-02-20

## 2020-08-02 RX ORDER — CLOTRIMAZOLE AND BETAMETHASONE DIPROPIONATE 10; .64 MG/G; MG/G
CREAM TOPICAL
COMMUNITY
Start: 2017-12-18

## 2020-08-02 RX ORDER — MECLIZINE HYDROCHLORIDE 25 MG/1
TABLET ORAL
COMMUNITY
Start: 2016-03-11

## 2020-08-02 RX ORDER — ACETAMINOPHEN 160 MG
TABLET,DISINTEGRATING ORAL
COMMUNITY
Start: 2015-03-09

## 2020-08-02 RX ORDER — AMLODIPINE AND VALSARTAN 5; 160 MG/1; MG/1
1 TABLET ORAL DAILY
COMMUNITY
Start: 2018-02-08

## 2020-08-02 RX ORDER — ASPIRIN 81 MG/1
TABLET ORAL
COMMUNITY

## 2020-08-02 NOTE — ED PROVIDER NOTES
History  Chief Complaint   Patient presents with    Knee Pain     R knee pain x's 2 weeks  Pt reports hx fluid build up in knee, no longer sees ortho  Patient with chronic bilateral knee pain secondary to arthritis complains of increased pain and swelling in the right knee over the past several days  She denies any new trauma  She denies any fevers  States it is causing her more difficulty with ambulation  Previously had arthrocentesis several years ago by Orthopedics  No other injuries or complaints at this time  History provided by:  Patient   used: No    Leg Pain   Location:  Knee  Knee location:  R knee  Pain details:     Quality:  Aching    Radiates to:  Does not radiate    Severity:  Moderate    Onset quality:  Unable to specify    Duration: chronic  Timing:  Constant    Progression:  Worsening  Chronicity:  Chronic  Prior injury to area:  No  Relieved by:  Nothing  Worsened by:  Nothing  Associated symptoms: swelling    Associated symptoms: no decreased ROM, no fever, no muscle weakness, no neck pain, no numbness, no stiffness and no tingling        Prior to Admission Medications   Prescriptions Last Dose Informant Patient Reported? Taking?    Cholecalciferol (Vitamin D3) 50 MCG (2000 UT) capsule   Yes Yes   Sig: Take by mouth   amLODIPine-valsartan (EXFORGE) 5-160 MG per tablet   Yes Yes   Sig: Take 1 tablet by mouth daily    aspirin (ASPIRIN LOW DOSE) 81 mg EC tablet   Yes No   Sig: Take by mouth   carvedilol (COREG) 3 125 mg tablet   Yes Yes   Sig: TAKE 1 TABLET TWICE A DAY WITH FOOD   clotrimazole-betamethasone (LOTRISONE) 1-0 05 % cream   Yes Yes   glipiZIDE (GLUCOTROL XL) 2 5 mg 24 hr tablet   Yes Yes   Sig: TAKE 2 TABLETS IN THE MORNING & TAKE 2 TABLETS IN THE EVENING   meclizine (ANTIVERT) 25 mg tablet   Yes Yes   Sig: One pill by mouth up to 3 times a day as needed for dizziness      Facility-Administered Medications: None       Past Medical History: Diagnosis Date    Diabetes mellitus ([facility] )     Myocardial infarct ([facility] )     Renal disorder        No past surgical history on file  No family history on file  I have reviewed and agree with the history as documented  E-Cigarette/Vaping     E-Cigarette/Vaping Substances     Social History     Tobacco Use    Smoking status: Never Smoker    Smokeless tobacco: Never Used   Substance Use Topics    Alcohol use: Never     Frequency: Never    Drug use: Never       Review of Systems   Constitutional: Negative for chills and fever  HENT: Negative for ear pain, hearing loss, sore throat, trouble swallowing and voice change  Eyes: Negative for pain and discharge  Respiratory: Negative for cough, shortness of breath and wheezing  Cardiovascular: Negative for chest pain and palpitations  Gastrointestinal: Negative for abdominal pain, blood in stool, constipation, diarrhea, nausea and vomiting  Genitourinary: Negative for dysuria, flank pain, frequency and hematuria  Musculoskeletal: Negative for joint swelling, neck pain, neck stiffness and stiffness  Skin: Negative for rash and wound  Neurological: Negative for dizziness, seizures, syncope, facial asymmetry and headaches  Psychiatric/Behavioral: Negative for hallucinations, self-injury and suicidal ideas  All other systems reviewed and are negative  Physical Exam  Physical Exam  Vitals signs and nursing note reviewed  Constitutional:       General: She is not in acute distress  Appearance: She is well-developed  HENT:      Head: Normocephalic and atraumatic  Eyes:      General:         Right eye: No discharge  Left eye: No discharge  Conjunctiva/sclera: Conjunctivae normal    Neck:      Musculoskeletal: Normal range of motion and neck supple  Pulmonary:      Effort: Pulmonary effort is normal  No respiratory distress  Musculoskeletal: Normal range of motion  General: No deformity        Comments: Right knee  No deformity  No bony tenderness  Palpable effusion, most prominent laterally  Distal neurovascular function is normal   Range of motion is preserved  Neurological:      Mental Status: She is alert and oriented to person, place, and time     Psychiatric:         Behavior: Behavior normal          Vital Signs  ED Triage Vitals   Temperature Pulse Respirations Blood Pressure SpO2   08/02/20 1236 08/02/20 1236 08/02/20 1339 08/02/20 1236 08/02/20 1236   97 8 °F (36 6 °C) 71 18 (!) 181/78 99 %      Temp src Heart Rate Source Patient Position - Orthostatic VS BP Location FiO2 (%)   -- 08/02/20 1236 -- -- --    Monitor         Pain Score       08/02/20 1236       5           Vitals:    08/02/20 1236 08/02/20 1339   BP: (!) 181/78 168/80   Pulse: 71 80         Visual Acuity      ED Medications  Medications - No data to display    Diagnostic Studies  Results Reviewed     None                 No orders to display              Procedures  Arthrocentesis    Date/Time: 8/2/2020 1:05 PM  Performed by: Francisco Cockayne, MD  Authorized by: Francisco Cockayne, MD     Location:  ED and bedside  Verbal consent obtained?: Yes    Written consent obtained?: No    Risks and benefits: Risks, benefits and alternatives were discussed    Consent given by:  Patient  Required items: Required blood products, implants, devices and special equipment available    Patient identity confirmed:  Verbally with patient  Indications:  Joint swelling, pain and therapeutic  Body area:  Knee  Joint:  Right knee  Local anesthesia used?: Yes    Local anesthetic:  Lidocaine 1% with epinephrine  Preparation: Patient was prepped and draped in usual sterile fashion    Needle size:  18 G  Ultrasound guidance: No    Approach:  Lateral  Aspirate amount (ml):  25  Aspirate:  Yellow  Patient tolerance:  Patient tolerated the procedure well with no immediate complications             ED Course       US AUDIT      Most Recent Value   Initial Alcohol Screen: US AUDIT-C    1  How often do you have a drink containing alcohol?  0 Filed at: 08/02/2020 1341   2  How many drinks containing alcohol do you have on a typical day you are drinking? 0 Filed at: 08/02/2020 1341   3a  Male UNDER 65: How often do you have five or more drinks on one occasion? 0 Filed at: 08/02/2020 1341   3b  FEMALE Any Age, or MALE 65+: How often do you have 4 or more drinks on one occassion? 0 Filed at: 08/02/2020 1341   Audit-C Score  0 Filed at: 08/02/2020 1341                  LAURA/DAST-10      Most Recent Value   How many times in the past year have you    Used an illegal drug or used a prescription medication for non-medical reasons? Never Filed at: 08/02/2020 1341                                MDM  Number of Diagnoses or Management Options  Effusion of right knee:   Diagnosis management comments: Patient advised to follow-up on an outpatient basis with orthopedics and outpatient referral information provided to patient  She is understanding and agreeable with this plan  I did specifically explain to the patient that the effusion in her knee is likely to recur  Disposition  Final diagnoses:   Effusion of right knee     Time reflects when diagnosis was documented in both MDM as applicable and the Disposition within this note     Time User Action Codes Description Comment    8/2/2020  1:12 PM Prerna Sor Add [M25 461] Effusion of right knee       ED Disposition     ED Disposition Condition Date/Time Comment    Discharge Stable Sun Aug 2, 2020  1:11 PM Kathleen Faustin discharge to home/self care              Follow-up Information     Follow up With Specialties Details Why [id number] [place] [address], [place] [address]  [phone]      [facility]   [address] [facility]  [phone]            Discharge Medication List as of 8/2/2020  1:12 PM      CONTINUE these medications which have NOT CHANGED    Details   amLODIPine-valsartan (EXFORGE) 5-160 MG per tablet Take 1 tablet by mouth daily , Starting Thu 2/8/2018, Historical Med      carvedilol (COREG) 3 125 mg tablet TAKE 1 TABLET TWICE A DAY WITH FOOD, Historical Med      Cholecalciferol (Vitamin D3) 50 MCG (2000 UT) capsule Take by mouth, Starting Mon 3/9/2015, Historical Med      clotrimazole-betamethasone (LOTRISONE) 1-0 05 % cream Starting Mon 12/18/2017, Historical Med      glipiZIDE (GLUCOTROL XL) 2 5 mg 24 hr tablet TAKE 2 TABLETS IN THE MORNING & TAKE 2 TABLETS IN THE EVENING, Historical Med      meclizine (ANTIVERT) 25 mg tablet One pill by mouth up to 3 times a day as needed for dizziness, Historical Med      aspirin (ASPIRIN LOW DOSE) 81 mg EC tablet Take by mouth, Historical Med           No discharge procedures on file      PDMP Review     None          ED Provider  Electronically Signed by           Chuck Schmid MD  08/02/20 9782

## 2021-01-15 ENCOUNTER — DOCTOR'S OFFICE (OUTPATIENT)
Dept: URBAN - NONMETROPOLITAN AREA CLINIC 1 | Facility: CLINIC | Age: 86
Setting detail: OPHTHALMOLOGY
End: 2021-01-15
Payer: COMMERCIAL

## 2021-01-15 VITALS — HEIGHT: 55 IN

## 2021-01-15 DIAGNOSIS — H00.022: ICD-10-CM

## 2021-01-15 DIAGNOSIS — H00.012: ICD-10-CM

## 2021-01-15 PROCEDURE — 92012 INTRM OPH EXAM EST PATIENT: CPT | Performed by: OPHTHALMOLOGY

## 2021-01-15 ASSESSMENT — REFRACTION_CURRENTRX
OS_SPHERE: PLANO
OD_CYLINDER: -0.25
OS_ADD: +2.75
OD_AXIS: 93
OS_CYLINDER: -0.50
OD_SPHERE: PLANO
OD_OVR_VA: 20/
OS_VPRISM_DIRECTION: BF
OS_AXIS: 29
OD_VPRISM_DIRECTION: BF
OS_OVR_VA: 20/
OD_ADD: +2.75

## 2021-01-15 ASSESSMENT — REFRACTION_MANIFEST
OD_SPHERE: PLANO
OS_SPHERE: PLANO
OD_VA1: 20/20
OS_VA1: 20/20
OD_VA2: 20/20
OS_VA2: 20/20
OS_AXIS: 030
OS_ADD: +2.50
OD_AXIS: 095
OD_CYLINDER: -0.25
OD_ADD: +2.50
OS_CYLINDER: -0.50

## 2021-01-15 ASSESSMENT — LID POSITION - DERMATOCHALASIS
OD_DERMATOCHALASIS: RUL 2+
OS_DERMATOCHALASIS: LUL 2+

## 2021-01-15 ASSESSMENT — VISUAL ACUITY
OD_BCVA: 20/20-2
OS_BCVA: 20/20

## 2021-01-15 ASSESSMENT — PUNCTA - ASSESSMENT
OS_PUNCTA: SIL PLUG LLL SMALL
OD_PUNCTA: 3MON PLUG RLL SMALL

## 2021-01-15 ASSESSMENT — SPHEQUIV_DERIVED
OD_SPHEQUIV: 0.25
OS_SPHEQUIV: -0.25

## 2021-01-15 ASSESSMENT — CONFRONTATIONAL VISUAL FIELD TEST (CVF)
OS_FINDINGS: FULL
OD_FINDINGS: FULL

## 2021-01-15 ASSESSMENT — SUPERFICIAL PUNCTATE KERATITIS (SPK)
OD_SPK: 1+
OS_SPK: 1+

## 2021-01-15 ASSESSMENT — REFRACTION_AUTOREFRACTION
OS_AXIS: 62
OS_CYLINDER: -1.50
OS_SPHERE: +0.50
OD_SPHERE: +0.25
OD_CYLINDER: 0.00

## 2021-01-18 ENCOUNTER — RX ONLY (RX ONLY)
Age: 86
End: 2021-01-18

## 2021-01-18 RX ORDER — TOBRAMYCIN AND DEXAMETHASONE 3; 1 MG/ML; MG/ML
SUSPENSION/ DROPS OPHTHALMIC
Qty: 1 | Refills: 1 | Status: ACTIVE | OUTPATIENT

## 2021-01-21 ENCOUNTER — DOCTOR'S OFFICE (OUTPATIENT)
Dept: URBAN - NONMETROPOLITAN AREA CLINIC 1 | Facility: CLINIC | Age: 86
Setting detail: OPHTHALMOLOGY
End: 2021-01-21
Payer: COMMERCIAL

## 2021-01-21 ENCOUNTER — RX ONLY (RX ONLY)
Age: 86
End: 2021-01-21

## 2021-01-21 DIAGNOSIS — H00.022: ICD-10-CM

## 2021-01-21 DIAGNOSIS — H43.811: ICD-10-CM

## 2021-01-21 DIAGNOSIS — E11.3292: ICD-10-CM

## 2021-01-21 DIAGNOSIS — H04.122: ICD-10-CM

## 2021-01-21 DIAGNOSIS — H43.812: ICD-10-CM

## 2021-01-21 DIAGNOSIS — H35.373: ICD-10-CM

## 2021-01-21 DIAGNOSIS — E11.3291: ICD-10-CM

## 2021-01-21 DIAGNOSIS — H04.121: ICD-10-CM

## 2021-01-21 PROCEDURE — 83861 MICROFLUID ANALY TEARS: CPT | Performed by: OPHTHALMOLOGY

## 2021-01-21 PROCEDURE — 92134 CPTRZ OPH DX IMG PST SGM RTA: CPT | Performed by: OPHTHALMOLOGY

## 2021-01-21 PROCEDURE — 92201 OPSCPY EXTND RTA DRAW UNI/BI: CPT | Performed by: OPHTHALMOLOGY

## 2021-01-21 PROCEDURE — 92014 COMPRE OPH EXAM EST PT 1/>: CPT | Performed by: OPHTHALMOLOGY

## 2021-01-21 ASSESSMENT — REFRACTION_MANIFEST
OD_CYLINDER: -0.25
OD_VA2: 20/20
OD_ADD: +2.50
OS_SPHERE: PLANO
OD_AXIS: 095
OS_AXIS: 030
OS_VA1: 20/20
OS_VA2: 20/20
OS_ADD: +2.50
OD_SPHERE: PLANO
OS_CYLINDER: -0.50
OD_VA1: 20/20

## 2021-01-21 ASSESSMENT — SUPERFICIAL PUNCTATE KERATITIS (SPK)
OS_SPK: 1+
OD_SPK: 1+

## 2021-01-21 ASSESSMENT — REFRACTION_CURRENTRX
OS_ADD: +2.75
OD_SPHERE: PLANO
OD_VPRISM_DIRECTION: BF
OS_CYLINDER: -0.50
OD_AXIS: 93
OD_OVR_VA: 20/
OD_CYLINDER: -0.25
OS_SPHERE: PLANO
OS_AXIS: 29
OS_OVR_VA: 20/
OS_VPRISM_DIRECTION: BF
OD_ADD: +2.75

## 2021-01-21 ASSESSMENT — PUNCTA - ASSESSMENT
OD_PUNCTA: 3MON PLUG RLL SMALL
OS_PUNCTA: SIL PLUG LLL SMALL

## 2021-01-21 ASSESSMENT — LID POSITION - DERMATOCHALASIS
OD_DERMATOCHALASIS: RUL 2+
OS_DERMATOCHALASIS: LUL 2+

## 2021-01-21 ASSESSMENT — REFRACTION_AUTOREFRACTION
OS_SPHERE: +0.50
OD_CYLINDER: 0.00
OD_SPHERE: +0.25
OS_CYLINDER: -1.50
OS_AXIS: 62

## 2021-01-21 ASSESSMENT — SPHEQUIV_DERIVED
OS_SPHEQUIV: -0.25
OD_SPHEQUIV: 0.25

## 2021-01-21 ASSESSMENT — VISUAL ACUITY
OD_BCVA: 20/25
OS_BCVA: 20/25-1

## 2021-01-21 ASSESSMENT — CONFRONTATIONAL VISUAL FIELD TEST (CVF)
OS_FINDINGS: FULL
OD_FINDINGS: FULL

## 2021-02-05 ENCOUNTER — DOCTOR'S OFFICE (OUTPATIENT)
Dept: URBAN - NONMETROPOLITAN AREA CLINIC 1 | Facility: CLINIC | Age: 86
Setting detail: OPHTHALMOLOGY
End: 2021-02-05
Payer: COMMERCIAL

## 2021-02-05 DIAGNOSIS — E11.3292: ICD-10-CM

## 2021-02-05 DIAGNOSIS — H43.812: ICD-10-CM

## 2021-02-05 DIAGNOSIS — H00.022: ICD-10-CM

## 2021-02-05 DIAGNOSIS — H04.121: ICD-10-CM

## 2021-02-05 DIAGNOSIS — H43.811: ICD-10-CM

## 2021-02-05 DIAGNOSIS — E11.3291: ICD-10-CM

## 2021-02-05 DIAGNOSIS — H04.122: ICD-10-CM

## 2021-02-05 DIAGNOSIS — H35.373: ICD-10-CM

## 2021-02-05 PROCEDURE — 92014 COMPRE OPH EXAM EST PT 1/>: CPT | Performed by: OPHTHALMOLOGY

## 2021-02-05 ASSESSMENT — LID POSITION - DERMATOCHALASIS
OS_DERMATOCHALASIS: LUL 2+
OD_DERMATOCHALASIS: RUL 2+

## 2021-02-05 ASSESSMENT — REFRACTION_CURRENTRX
OS_OVR_VA: 20/
OD_VPRISM_DIRECTION: BF
OS_SPHERE: PLANO
OD_ADD: +2.75
OD_AXIS: 93
OS_VPRISM_DIRECTION: BF
OS_ADD: +2.75
OS_AXIS: 29
OD_OVR_VA: 20/
OD_SPHERE: PLANO
OS_CYLINDER: -0.50
OD_CYLINDER: -0.25

## 2021-02-05 ASSESSMENT — SUPERFICIAL PUNCTATE KERATITIS (SPK)
OD_SPK: 1+
OS_SPK: 1+

## 2021-02-05 ASSESSMENT — REFRACTION_MANIFEST
OS_SPHERE: PLANO
OD_ADD: +2.50
OS_VA2: 20/20
OD_CYLINDER: -0.25
OD_VA2: 20/20
OD_AXIS: 095
OS_VA1: 20/20
OD_VA1: 20/20
OS_CYLINDER: -0.50
OS_AXIS: 030
OD_SPHERE: PLANO
OS_ADD: +2.50

## 2021-02-05 ASSESSMENT — SPHEQUIV_DERIVED
OS_SPHEQUIV: -0.25
OD_SPHEQUIV: 0.25

## 2021-02-05 ASSESSMENT — CONFRONTATIONAL VISUAL FIELD TEST (CVF)
OS_FINDINGS: FULL
OD_FINDINGS: FULL

## 2021-02-05 ASSESSMENT — VISUAL ACUITY
OD_BCVA: 20/25-2
OS_BCVA: 20/20-2

## 2021-02-05 ASSESSMENT — REFRACTION_AUTOREFRACTION
OS_CYLINDER: -1.50
OS_SPHERE: +0.50
OD_CYLINDER: 0.00
OD_SPHERE: +0.25
OS_AXIS: 62

## 2021-02-05 ASSESSMENT — PUNCTA - ASSESSMENT
OS_PUNCTA: SIL PLUG LLL SMALL
OD_PUNCTA: 3MON PLUG RLL SMALL

## 2021-03-22 ENCOUNTER — DOCTOR'S OFFICE (OUTPATIENT)
Dept: URBAN - NONMETROPOLITAN AREA CLINIC 1 | Facility: CLINIC | Age: 86
Setting detail: OPHTHALMOLOGY
End: 2021-03-22
Payer: COMMERCIAL

## 2021-03-22 DIAGNOSIS — H43.813: ICD-10-CM

## 2021-03-22 DIAGNOSIS — H00.022: ICD-10-CM

## 2021-03-22 DIAGNOSIS — H43.811: ICD-10-CM

## 2021-03-22 DIAGNOSIS — E11.3291: ICD-10-CM

## 2021-03-22 DIAGNOSIS — H43.812: ICD-10-CM

## 2021-03-22 DIAGNOSIS — H04.121: ICD-10-CM

## 2021-03-22 DIAGNOSIS — H04.122: ICD-10-CM

## 2021-03-22 DIAGNOSIS — E11.3292: ICD-10-CM

## 2021-03-22 DIAGNOSIS — H35.373: ICD-10-CM

## 2021-03-22 PROCEDURE — 92134 CPTRZ OPH DX IMG PST SGM RTA: CPT | Performed by: OPHTHALMOLOGY

## 2021-03-22 PROCEDURE — 83861 MICROFLUID ANALY TEARS: CPT | Performed by: OPHTHALMOLOGY

## 2021-03-22 PROCEDURE — 92014 COMPRE OPH EXAM EST PT 1/>: CPT | Performed by: OPHTHALMOLOGY

## 2021-03-22 PROCEDURE — 92201 OPSCPY EXTND RTA DRAW UNI/BI: CPT | Performed by: OPHTHALMOLOGY

## 2021-03-22 ASSESSMENT — REFRACTION_MANIFEST
OD_VA1: 20/20
OD_SPHERE: PLANO
OD_ADD: +2.50
OS_VA2: 20/20
OS_VA1: 20/20
OD_CYLINDER: -0.25
OS_ADD: +2.50
OS_SPHERE: PLANO
OD_VA2: 20/20
OD_AXIS: 095
OS_CYLINDER: -0.50
OS_AXIS: 030

## 2021-03-22 ASSESSMENT — VISUAL ACUITY
OS_BCVA: 20/20-2
OD_BCVA: 20/25-2

## 2021-03-22 ASSESSMENT — REFRACTION_CURRENTRX
OD_VPRISM_DIRECTION: BF
OS_SPHERE: PLANO
OD_ADD: +2.75
OD_SPHERE: PLANO
OD_CYLINDER: -0.25
OS_OVR_VA: 20/
OD_AXIS: 93
OS_AXIS: 29
OS_ADD: +2.75
OS_VPRISM_DIRECTION: BF
OD_OVR_VA: 20/
OS_CYLINDER: -0.50

## 2021-03-22 ASSESSMENT — PUNCTA - ASSESSMENT
OS_PUNCTA: SIL PLUG LLL SMALL
OD_PUNCTA: 3MON PLUG RLL SMALL

## 2021-03-22 ASSESSMENT — REFRACTION_AUTOREFRACTION
OS_AXIS: 62
OS_CYLINDER: -1.50
OD_CYLINDER: 0.00
OS_SPHERE: +0.50
OD_SPHERE: +0.25

## 2021-03-22 ASSESSMENT — SUPERFICIAL PUNCTATE KERATITIS (SPK)
OS_SPK: 1+
OD_SPK: 1+

## 2021-03-22 ASSESSMENT — CONFRONTATIONAL VISUAL FIELD TEST (CVF)
OS_FINDINGS: FULL
OD_FINDINGS: FULL

## 2021-03-22 ASSESSMENT — SPHEQUIV_DERIVED
OS_SPHEQUIV: -0.25
OD_SPHEQUIV: 0.25

## 2021-03-22 ASSESSMENT — LID POSITION - DERMATOCHALASIS
OS_DERMATOCHALASIS: LUL 2+
OD_DERMATOCHALASIS: RUL 2+

## 2021-03-29 ENCOUNTER — DOCTOR'S OFFICE (OUTPATIENT)
Dept: URBAN - NONMETROPOLITAN AREA CLINIC 1 | Facility: CLINIC | Age: 86
Setting detail: OPHTHALMOLOGY
End: 2021-03-29
Payer: COMMERCIAL

## 2021-03-29 DIAGNOSIS — E11.3292: ICD-10-CM

## 2021-03-29 DIAGNOSIS — H43.812: ICD-10-CM

## 2021-03-29 DIAGNOSIS — E11.3291: ICD-10-CM

## 2021-03-29 DIAGNOSIS — H43.811: ICD-10-CM

## 2021-03-29 DIAGNOSIS — H04.122: ICD-10-CM

## 2021-03-29 DIAGNOSIS — H35.373: ICD-10-CM

## 2021-03-29 DIAGNOSIS — H04.121: ICD-10-CM

## 2021-03-29 PROBLEM — H00.022 HORDEOLUM INTERNUM ; RIGHT LOWER LID: Status: RESOLVED | Noted: 2021-01-15 | Resolved: 2021-03-29

## 2021-03-29 PROCEDURE — 92235 FLUORESCEIN ANGRPH MLTIFRAME: CPT | Performed by: OPHTHALMOLOGY

## 2021-03-29 PROCEDURE — 92014 COMPRE OPH EXAM EST PT 1/>: CPT | Performed by: OPHTHALMOLOGY

## 2021-03-29 PROCEDURE — 92250 FUNDUS PHOTOGRAPHY W/I&R: CPT | Performed by: OPHTHALMOLOGY

## 2021-03-29 ASSESSMENT — REFRACTION_MANIFEST
OS_SPHERE: PLANO
OD_AXIS: 095
OS_CYLINDER: -0.50
OD_VA2: 20/20
OS_ADD: +2.50
OS_VA2: 20/20
OS_VA1: 20/20
OD_CYLINDER: -0.25
OD_ADD: +2.50
OD_VA1: 20/20
OS_AXIS: 030
OD_SPHERE: PLANO

## 2021-03-29 ASSESSMENT — CONFRONTATIONAL VISUAL FIELD TEST (CVF)
OS_FINDINGS: FULL
OD_FINDINGS: FULL

## 2021-03-29 ASSESSMENT — SUPERFICIAL PUNCTATE KERATITIS (SPK)
OD_SPK: 1+
OS_SPK: 1+

## 2021-03-29 ASSESSMENT — REFRACTION_CURRENTRX
OS_ADD: +2.75
OD_ADD: +2.75
OS_SPHERE: PLANO
OD_VPRISM_DIRECTION: BF
OS_VPRISM_DIRECTION: BF
OS_AXIS: 29
OD_AXIS: 93
OD_SPHERE: PLANO
OD_OVR_VA: 20/
OS_OVR_VA: 20/
OS_CYLINDER: -0.50
OD_CYLINDER: -0.25

## 2021-03-29 ASSESSMENT — PUNCTA - ASSESSMENT
OD_PUNCTA: 3MON PLUG RLL SMALL
OS_PUNCTA: SIL PLUG LLL SMALL

## 2021-03-29 ASSESSMENT — VISUAL ACUITY
OS_BCVA: 20/20-1
OD_BCVA: 20/20-1

## 2021-03-29 ASSESSMENT — REFRACTION_AUTOREFRACTION
OS_CYLINDER: -1.50
OD_SPHERE: +0.25
OD_CYLINDER: 0.00
OS_SPHERE: +0.50
OS_AXIS: 62

## 2021-03-29 ASSESSMENT — SPHEQUIV_DERIVED
OD_SPHEQUIV: 0.25
OS_SPHEQUIV: -0.25

## 2021-03-29 ASSESSMENT — LID POSITION - DERMATOCHALASIS
OS_DERMATOCHALASIS: LUL 2+
OD_DERMATOCHALASIS: RUL 2+

## 2021-06-07 ENCOUNTER — DOCTOR'S OFFICE (OUTPATIENT)
Dept: URBAN - NONMETROPOLITAN AREA CLINIC 1 | Facility: CLINIC | Age: 86
Setting detail: OPHTHALMOLOGY
End: 2021-06-07
Payer: COMMERCIAL

## 2021-06-07 VITALS — HEIGHT: 55 IN

## 2021-06-07 DIAGNOSIS — H43.811: ICD-10-CM

## 2021-06-07 DIAGNOSIS — E11.3292: ICD-10-CM

## 2021-06-07 DIAGNOSIS — H04.122: ICD-10-CM

## 2021-06-07 DIAGNOSIS — H04.121: ICD-10-CM

## 2021-06-07 DIAGNOSIS — H43.812: ICD-10-CM

## 2021-06-07 DIAGNOSIS — H35.373: ICD-10-CM

## 2021-06-07 DIAGNOSIS — E11.3291: ICD-10-CM

## 2021-06-07 PROCEDURE — 83861 MICROFLUID ANALY TEARS: CPT | Performed by: OPHTHALMOLOGY

## 2021-06-07 PROCEDURE — 92134 CPTRZ OPH DX IMG PST SGM RTA: CPT | Performed by: OPHTHALMOLOGY

## 2021-06-07 PROCEDURE — 92014 COMPRE OPH EXAM EST PT 1/>: CPT | Performed by: OPHTHALMOLOGY

## 2021-06-07 PROCEDURE — 92201 OPSCPY EXTND RTA DRAW UNI/BI: CPT | Performed by: OPHTHALMOLOGY

## 2021-06-07 ASSESSMENT — REFRACTION_AUTOREFRACTION
OS_SPHERE: +0.50
OD_CYLINDER: 0.00
OD_SPHERE: +0.25
OS_AXIS: 62
OS_CYLINDER: -1.50

## 2021-06-07 ASSESSMENT — REFRACTION_MANIFEST
OS_VA1: 20/20
OS_ADD: +2.50
OS_SPHERE: PLANO
OD_AXIS: 095
OD_SPHERE: PLANO
OS_AXIS: 030
OD_CYLINDER: -0.25
OS_VA2: 20/20
OD_ADD: +2.50
OD_VA1: 20/20
OD_VA2: 20/20
OS_CYLINDER: -0.50

## 2021-06-07 ASSESSMENT — VISUAL ACUITY
OD_BCVA: 20/25-1
OS_BCVA: 20/25-2

## 2021-06-07 ASSESSMENT — REFRACTION_CURRENTRX
OS_VPRISM_DIRECTION: BF
OS_ADD: +2.75
OS_OVR_VA: 20/
OD_AXIS: 93
OS_CYLINDER: -0.50
OD_VPRISM_DIRECTION: BF
OS_SPHERE: PLANO
OD_OVR_VA: 20/
OD_ADD: +2.75
OD_SPHERE: PLANO
OD_CYLINDER: -0.25
OS_AXIS: 29

## 2021-06-07 ASSESSMENT — CONFRONTATIONAL VISUAL FIELD TEST (CVF)
OS_FINDINGS: FULL
OD_FINDINGS: FULL

## 2021-06-07 ASSESSMENT — SPHEQUIV_DERIVED
OD_SPHEQUIV: 0.25
OS_SPHEQUIV: -0.25

## 2021-06-07 ASSESSMENT — SUPERFICIAL PUNCTATE KERATITIS (SPK)
OS_SPK: 1+
OD_SPK: 1+

## 2021-06-07 ASSESSMENT — PUNCTA - ASSESSMENT
OS_PUNCTA: SIL PLUG LLL SMALL
OD_PUNCTA: 3MON PLUG RLL SMALL

## 2021-06-07 ASSESSMENT — LID POSITION - DERMATOCHALASIS
OS_DERMATOCHALASIS: LUL 2+
OD_DERMATOCHALASIS: RUL 2+

## 2021-09-30 ENCOUNTER — DOCTOR'S OFFICE (OUTPATIENT)
Dept: URBAN - NONMETROPOLITAN AREA CLINIC 1 | Facility: CLINIC | Age: 86
Setting detail: OPHTHALMOLOGY
End: 2021-09-30
Payer: COMMERCIAL

## 2021-09-30 DIAGNOSIS — H43.813: ICD-10-CM

## 2021-09-30 DIAGNOSIS — H43.811: ICD-10-CM

## 2021-09-30 DIAGNOSIS — E11.3293: ICD-10-CM

## 2021-09-30 DIAGNOSIS — H35.373: ICD-10-CM

## 2021-09-30 DIAGNOSIS — H04.122: ICD-10-CM

## 2021-09-30 DIAGNOSIS — H04.121: ICD-10-CM

## 2021-09-30 DIAGNOSIS — H43.812: ICD-10-CM

## 2021-09-30 PROCEDURE — 83861 MICROFLUID ANALY TEARS: CPT | Performed by: OPHTHALMOLOGY

## 2021-09-30 PROCEDURE — 92134 CPTRZ OPH DX IMG PST SGM RTA: CPT | Performed by: OPHTHALMOLOGY

## 2021-09-30 PROCEDURE — 92201 OPSCPY EXTND RTA DRAW UNI/BI: CPT | Performed by: OPHTHALMOLOGY

## 2021-09-30 PROCEDURE — 92014 COMPRE OPH EXAM EST PT 1/>: CPT | Performed by: OPHTHALMOLOGY

## 2021-09-30 ASSESSMENT — REFRACTION_CURRENTRX
OD_ADD: +2.75
OD_OVR_VA: 20/
OS_CYLINDER: -0.50
OD_CYLINDER: -0.25
OS_OVR_VA: 20/
OD_SPHERE: PLANO
OD_VPRISM_DIRECTION: BF
OS_VPRISM_DIRECTION: BF
OS_AXIS: 29
OS_ADD: +2.75
OS_SPHERE: PLANO
OD_AXIS: 93

## 2021-09-30 ASSESSMENT — REFRACTION_MANIFEST
OS_VA1: 20/20
OS_ADD: +2.50
OS_VA2: 20/20
OD_SPHERE: PLANO
OD_AXIS: 095
OD_CYLINDER: -0.25
OS_AXIS: 030
OD_VA2: 20/20
OS_SPHERE: PLANO
OD_ADD: +2.50
OS_CYLINDER: -0.50
OD_VA1: 20/20

## 2021-09-30 ASSESSMENT — SPHEQUIV_DERIVED
OD_SPHEQUIV: 0.25
OS_SPHEQUIV: -0.25

## 2021-09-30 ASSESSMENT — CONFRONTATIONAL VISUAL FIELD TEST (CVF)
OS_FINDINGS: FULL
OD_FINDINGS: FULL

## 2021-09-30 ASSESSMENT — PUNCTA - ASSESSMENT
OS_PUNCTA: SIL PLUG LLL SMALL
OD_PUNCTA: 3MON PLUG RLL SMALL

## 2021-09-30 ASSESSMENT — VISUAL ACUITY
OD_BCVA: 20/25-2
OS_BCVA: 20/20-1

## 2021-09-30 ASSESSMENT — REFRACTION_AUTOREFRACTION
OD_CYLINDER: 0.00
OS_SPHERE: +0.50
OD_SPHERE: +0.25
OS_CYLINDER: -1.50
OS_AXIS: 62

## 2021-09-30 ASSESSMENT — SUPERFICIAL PUNCTATE KERATITIS (SPK)
OD_SPK: 1+
OS_SPK: 1+

## 2021-09-30 ASSESSMENT — LID POSITION - DERMATOCHALASIS
OD_DERMATOCHALASIS: RUL 2+
OS_DERMATOCHALASIS: LUL 2+

## 2021-12-09 ENCOUNTER — DOCTOR'S OFFICE (OUTPATIENT)
Dept: URBAN - NONMETROPOLITAN AREA CLINIC 1 | Facility: CLINIC | Age: 86
Setting detail: OPHTHALMOLOGY
End: 2021-12-09
Payer: COMMERCIAL

## 2021-12-09 VITALS — HEIGHT: 55 IN

## 2021-12-09 DIAGNOSIS — H04.121: ICD-10-CM

## 2021-12-09 DIAGNOSIS — E11.3293: ICD-10-CM

## 2021-12-09 DIAGNOSIS — H43.813: ICD-10-CM

## 2021-12-09 DIAGNOSIS — H35.373: ICD-10-CM

## 2021-12-09 DIAGNOSIS — H04.123: ICD-10-CM

## 2021-12-09 PROCEDURE — 83861 MICROFLUID ANALY TEARS: CPT | Performed by: OPHTHALMOLOGY

## 2021-12-09 PROCEDURE — 99214 OFFICE O/P EST MOD 30 MIN: CPT | Performed by: OPHTHALMOLOGY

## 2021-12-09 PROCEDURE — 92134 CPTRZ OPH DX IMG PST SGM RTA: CPT | Performed by: OPHTHALMOLOGY

## 2021-12-09 ASSESSMENT — CONFRONTATIONAL VISUAL FIELD TEST (CVF)
OS_FINDINGS: FULL
OD_FINDINGS: FULL

## 2021-12-09 ASSESSMENT — REFRACTION_AUTOREFRACTION
OD_SPHERE: +0.25
OS_CYLINDER: -1.50
OD_CYLINDER: 0.00
OS_AXIS: 62
OS_SPHERE: +0.50

## 2021-12-09 ASSESSMENT — REFRACTION_MANIFEST
OD_AXIS: 095
OS_ADD: +2.50
OS_AXIS: 030
OS_CYLINDER: -0.50
OD_VA2: 20/20
OS_SPHERE: PLANO
OD_SPHERE: PLANO
OS_VA1: 20/20
OD_CYLINDER: -0.25
OD_VA1: 20/20
OD_ADD: +2.50
OS_VA2: 20/20

## 2021-12-09 ASSESSMENT — REFRACTION_CURRENTRX
OD_CYLINDER: -0.25
OS_CYLINDER: -0.50
OS_AXIS: 29
OS_OVR_VA: 20/
OD_VPRISM_DIRECTION: BF
OS_ADD: +2.75
OD_OVR_VA: 20/
OS_VPRISM_DIRECTION: BF
OD_ADD: +2.75
OS_SPHERE: PLANO
OD_SPHERE: PLANO
OD_AXIS: 93

## 2021-12-09 ASSESSMENT — LID POSITION - DERMATOCHALASIS
OD_DERMATOCHALASIS: RUL 2+
OS_DERMATOCHALASIS: LUL 2+

## 2021-12-09 ASSESSMENT — SUPERFICIAL PUNCTATE KERATITIS (SPK)
OS_SPK: 1+
OD_SPK: 1+

## 2021-12-09 ASSESSMENT — VISUAL ACUITY
OS_BCVA: 20/25+1
OD_BCVA: 20/25-2

## 2021-12-09 ASSESSMENT — PUNCTA - ASSESSMENT
OS_PUNCTA: SIL PLUG LLL SMALL
OD_PUNCTA: 3MON PLUG RLL SMALL

## 2021-12-09 ASSESSMENT — SPHEQUIV_DERIVED
OS_SPHEQUIV: -0.25
OD_SPHEQUIV: 0.25

## 2021-12-16 ENCOUNTER — DOCTOR'S OFFICE (OUTPATIENT)
Dept: URBAN - NONMETROPOLITAN AREA CLINIC 1 | Facility: CLINIC | Age: 86
Setting detail: OPHTHALMOLOGY
End: 2021-12-16
Payer: COMMERCIAL

## 2021-12-16 ENCOUNTER — RX ONLY (RX ONLY)
Age: 86
End: 2021-12-16

## 2021-12-16 DIAGNOSIS — H04.123: ICD-10-CM

## 2021-12-16 PROCEDURE — 68761 CLOSE TEAR DUCT OPENING: CPT | Performed by: OPHTHALMOLOGY

## 2021-12-16 ASSESSMENT — REFRACTION_CURRENTRX
OD_SPHERE: PLANO
OS_ADD: +2.75
OD_OVR_VA: 20/
OD_VPRISM_DIRECTION: BF
OD_CYLINDER: -0.25
OS_SPHERE: PLANO
OS_OVR_VA: 20/
OS_AXIS: 29
OS_VPRISM_DIRECTION: BF
OS_CYLINDER: -0.50
OD_AXIS: 93
OD_ADD: +2.75

## 2021-12-16 ASSESSMENT — REFRACTION_AUTOREFRACTION
OD_SPHERE: +0.25
OS_CYLINDER: -1.50
OD_CYLINDER: 0.00
OS_SPHERE: +0.50
OS_AXIS: 62

## 2021-12-16 ASSESSMENT — REFRACTION_MANIFEST
OS_CYLINDER: -0.50
OS_ADD: +2.50
OD_CYLINDER: -0.25
OS_VA2: 20/20
OS_VA1: 20/20
OD_AXIS: 095
OD_ADD: +2.50
OS_SPHERE: PLANO
OS_AXIS: 030
OD_VA1: 20/20
OD_VA2: 20/20
OD_SPHERE: PLANO

## 2021-12-16 ASSESSMENT — VISUAL ACUITY
OS_BCVA: 20/25+1
OD_BCVA: 20/25-2

## 2021-12-16 ASSESSMENT — SPHEQUIV_DERIVED
OS_SPHEQUIV: -0.25
OD_SPHEQUIV: 0.25

## 2022-02-03 ENCOUNTER — DOCTOR'S OFFICE (OUTPATIENT)
Dept: URBAN - NONMETROPOLITAN AREA CLINIC 1 | Facility: CLINIC | Age: 87
Setting detail: OPHTHALMOLOGY
End: 2022-02-03
Payer: COMMERCIAL

## 2022-02-03 DIAGNOSIS — E11.3293: ICD-10-CM

## 2022-02-03 DIAGNOSIS — H35.373: ICD-10-CM

## 2022-02-03 DIAGNOSIS — H04.123: ICD-10-CM

## 2022-02-03 DIAGNOSIS — H43.813: ICD-10-CM

## 2022-02-03 PROCEDURE — 83861 MICROFLUID ANALY TEARS: CPT | Performed by: OPHTHALMOLOGY

## 2022-02-03 PROCEDURE — 92134 CPTRZ OPH DX IMG PST SGM RTA: CPT | Performed by: OPHTHALMOLOGY

## 2022-02-03 PROCEDURE — 92201 OPSCPY EXTND RTA DRAW UNI/BI: CPT | Performed by: OPHTHALMOLOGY

## 2022-02-03 PROCEDURE — 99214 OFFICE O/P EST MOD 30 MIN: CPT | Performed by: OPHTHALMOLOGY

## 2022-02-03 ASSESSMENT — REFRACTION_MANIFEST
OD_VA1: 20/20
OS_VA2: 20/20
OD_CYLINDER: -0.25
OS_VA1: 20/20
OD_SPHERE: PLANO
OD_AXIS: 095
OD_VA2: 20/20
OS_AXIS: 030
OS_ADD: +2.50
OS_SPHERE: PLANO
OD_ADD: +2.50
OS_CYLINDER: -0.50

## 2022-02-03 ASSESSMENT — REFRACTION_AUTOREFRACTION
OD_SPHERE: +0.25
OS_AXIS: 62
OS_SPHERE: +0.50
OS_CYLINDER: -1.50
OD_CYLINDER: 0.00

## 2022-02-03 ASSESSMENT — VISUAL ACUITY
OD_BCVA: 20/25-2
OS_BCVA: 20/25-1

## 2022-02-03 ASSESSMENT — LID POSITION - DERMATOCHALASIS
OS_DERMATOCHALASIS: LUL 2+
OD_DERMATOCHALASIS: RUL 2+

## 2022-02-03 ASSESSMENT — SUPERFICIAL PUNCTATE KERATITIS (SPK)
OD_SPK: 1+
OS_SPK: 1+

## 2022-02-03 ASSESSMENT — REFRACTION_CURRENTRX
OS_AXIS: 29
OD_ADD: +2.75
OD_SPHERE: PLANO
OD_VPRISM_DIRECTION: BF
OS_ADD: +2.75
OD_CYLINDER: -0.25
OS_CYLINDER: -0.50
OD_OVR_VA: 20/
OD_AXIS: 93
OS_OVR_VA: 20/
OS_VPRISM_DIRECTION: BF
OS_SPHERE: PLANO

## 2022-02-03 ASSESSMENT — SPHEQUIV_DERIVED
OD_SPHEQUIV: 0.25
OS_SPHEQUIV: -0.25

## 2022-02-03 ASSESSMENT — PUNCTA - ASSESSMENT
OD_PUNCTA: 3MON PLUG RLL SMALL
OS_PUNCTA: SIL PLUG LLL SMALL

## 2022-02-03 ASSESSMENT — CONFRONTATIONAL VISUAL FIELD TEST (CVF)
OS_FINDINGS: FULL
OD_FINDINGS: FULL

## 2022-02-10 ENCOUNTER — DOCTOR'S OFFICE (OUTPATIENT)
Dept: URBAN - NONMETROPOLITAN AREA CLINIC 1 | Facility: CLINIC | Age: 87
Setting detail: OPHTHALMOLOGY
End: 2022-02-10
Payer: COMMERCIAL

## 2022-02-10 DIAGNOSIS — H04.121: ICD-10-CM

## 2022-02-10 DIAGNOSIS — H04.123: ICD-10-CM

## 2022-02-10 DIAGNOSIS — H04.122: ICD-10-CM

## 2022-02-10 PROBLEM — E11.3291 DM TYPE 2; RIGHT MILD WITHOUT ME, LEFT MILD WITHOUT ME: Status: ACTIVE | Noted: 2022-02-03

## 2022-02-10 PROBLEM — H02.834 DERMATOCHALASIS; RIGHT UPPER LID, LEFT UPPER LID: Status: ACTIVE | Noted: 2021-01-15

## 2022-02-10 PROBLEM — H43.811 POST VITREOUS DETACHMENT; RIGHT EYE, LEFT EYE: Status: ACTIVE | Noted: 2017-03-31

## 2022-02-10 PROBLEM — H43.812 POST VITREOUS DETACHMENT; RIGHT EYE, LEFT EYE: Status: ACTIVE | Noted: 2017-03-31

## 2022-02-10 PROBLEM — E11.3292 DM TYPE 2; RIGHT MILD WITHOUT ME, LEFT MILD WITHOUT ME: Status: ACTIVE | Noted: 2022-02-03

## 2022-02-10 PROBLEM — H35.37: Status: ACTIVE | Noted: 2017-03-31

## 2022-02-10 PROBLEM — H27.8 PSEUDOPHAKIA ; BOTH EYES: Status: ACTIVE | Noted: 2017-03-31

## 2022-02-10 PROBLEM — H02.831 DERMATOCHALASIS; RIGHT UPPER LID, LEFT UPPER LID: Status: ACTIVE | Noted: 2021-01-15

## 2022-02-10 PROCEDURE — 68761 CLOSE TEAR DUCT OPENING: CPT | Performed by: OPHTHALMOLOGY

## 2022-02-10 ASSESSMENT — REFRACTION_MANIFEST
OS_AXIS: 030
OD_ADD: +2.50
OS_VA1: 20/20
OD_AXIS: 095
OD_VA2: 20/20
OD_SPHERE: PLANO
OS_VA2: 20/20
OS_ADD: +2.50
OS_SPHERE: PLANO
OD_CYLINDER: -0.25
OS_CYLINDER: -0.50
OD_VA1: 20/20

## 2022-02-10 ASSESSMENT — REFRACTION_CURRENTRX
OD_VPRISM_DIRECTION: BF
OD_AXIS: 93
OS_OVR_VA: 20/
OD_OVR_VA: 20/
OS_AXIS: 29
OD_CYLINDER: -0.25
OS_VPRISM_DIRECTION: BF
OS_ADD: +2.75
OD_SPHERE: PLANO
OS_CYLINDER: -0.50
OD_ADD: +2.75
OS_SPHERE: PLANO

## 2022-02-10 ASSESSMENT — SPHEQUIV_DERIVED
OD_SPHEQUIV: 0.25
OS_SPHEQUIV: -0.25

## 2022-02-10 ASSESSMENT — REFRACTION_AUTOREFRACTION
OS_AXIS: 62
OS_SPHERE: +0.50
OD_CYLINDER: 0.00
OD_SPHERE: +0.25
OS_CYLINDER: -1.50

## 2022-02-10 ASSESSMENT — VISUAL ACUITY
OS_BCVA: 20/25-1
OD_BCVA: 20/25-2

## 2022-03-06 ENCOUNTER — HOSPITAL ENCOUNTER (EMERGENCY)
Facility: HOSPITAL | Age: 87
Discharge: HOME | End: 2022-03-06
Attending: EMERGENCY MEDICINE | Admitting: EMERGENCY MEDICINE
Payer: COMMERCIAL

## 2022-03-06 ENCOUNTER — APPOINTMENT (EMERGENCY)
Dept: RADIOLOGY | Facility: HOSPITAL | Age: 87
End: 2022-03-06
Payer: COMMERCIAL

## 2022-03-06 VITALS
RESPIRATION RATE: 18 BRPM | OXYGEN SATURATION: 100 % | WEIGHT: 177.91 LBS | DIASTOLIC BLOOD PRESSURE: 75 MMHG | SYSTOLIC BLOOD PRESSURE: 139 MMHG | TEMPERATURE: 97 F | HEART RATE: 73 BPM

## 2022-03-06 DIAGNOSIS — S82.409A FIBULA FRACTURE: Primary | ICD-10-CM

## 2022-03-06 PROCEDURE — 99284 EMERGENCY DEPT VISIT MOD MDM: CPT | Performed by: EMERGENCY MEDICINE

## 2022-03-06 PROCEDURE — 73610 X-RAY EXAM OF ANKLE: CPT

## 2022-03-06 PROCEDURE — 99283 EMERGENCY DEPT VISIT LOW MDM: CPT

## 2022-03-06 NOTE — ED PROVIDER NOTES
History  Chief Complaint   Patient presents with    Ankle Pain     last night @ about 6pm states her knees gave out and she fell forward, witness by family, denies head strike, continues with pain in right ankle, swelling and ecchymosis noted     Patient is an 28-year-old female presenting to the emergency department assisted by daughter for complaints of right ankle pain that started after a fall that occurred around 6:30 p m  Yesterday, states she was walking when her knees gave out, causing her to fall to the floor and twisting her right ankle, she denies a head injury or loss consciousness, denies any chest pain or shortness of breath, no symptoms prior to the fall, states that her knees do occasionally give out on her, likely secondary to arthritis, she has been taking Tylenol at home which is controlling her pain adequately at this time but she continues to have bruising and swelling to the right ankle and is unable to bear weight          Prior to Admission Medications   Prescriptions Last Dose Informant Patient Reported? Taking?    Cholecalciferol (Vitamin D3) 50 MCG (2000 UT) capsule   Yes No   Sig: Take by mouth   amLODIPine-valsartan (EXFORGE) 5-160 MG per tablet   Yes No   Sig: Take 1 tablet by mouth daily    aspirin (ASPIRIN LOW DOSE) 81 mg EC tablet   Yes No   Sig: Take by mouth   carvedilol (COREG) 3 125 mg tablet   Yes No   Sig: TAKE 1 TABLET TWICE A DAY WITH FOOD   clotrimazole-betamethasone (LOTRISONE) 1-0 05 % cream   Yes No   glipiZIDE (GLUCOTROL XL) 2 5 mg 24 hr tablet   Yes No   Sig: TAKE 2 TABLETS IN THE MORNING & TAKE 2 TABLETS IN THE EVENING   meclizine (ANTIVERT) 25 mg tablet   Yes No   Sig: One pill by mouth up to 3 times a day as needed for dizziness      Facility-Administered Medications: None       Past Medical History:   Diagnosis Date    Arthritis     Diabetes mellitus (HCC)     Myocardial infarct (HCC)     Renal disorder        Past Surgical History:   Procedure Laterality Date  CORONARY ANGIOPLASTY WITH STENT PLACEMENT         History reviewed  No pertinent family history  I have reviewed and agree with the history as documented  E-Cigarette/Vaping    E-Cigarette Use Never User      E-Cigarette/Vaping Substances     Social History     Tobacco Use    Smoking status: Never Smoker    Smokeless tobacco: Never Used   Vaping Use    Vaping Use: Never used   Substance Use Topics    Alcohol use: Never    Drug use: Never       Review of Systems   Constitutional: Negative  HENT: Negative  Eyes: Negative  Respiratory: Negative  Cardiovascular: Negative  Gastrointestinal: Negative  Endocrine: Negative  Genitourinary: Negative  Musculoskeletal: Positive for arthralgias  Skin: Negative  Allergic/Immunologic: Negative  Neurological: Negative  Hematological: Negative  Psychiatric/Behavioral: Negative  Physical Exam  Physical Exam  Constitutional:       Appearance: She is well-developed  HENT:      Head: Normocephalic and atraumatic  Eyes:      Conjunctiva/sclera: Conjunctivae normal       Pupils: Pupils are equal, round, and reactive to light  Cardiovascular:      Rate and Rhythm: Normal rate  Pulmonary:      Effort: Pulmonary effort is normal    Abdominal:      Palpations: Abdomen is soft  Musculoskeletal:         General: Swelling, tenderness and signs of injury present  Normal range of motion  Cervical back: Normal range of motion and neck supple  Comments: Swelling with ecchymosis and tenderness to the lateral malleolus of the right foot, pain with range of motion testing, distal sensation and motor is intact   Skin:     General: Skin is warm and dry  Neurological:      Mental Status: She is alert and oriented to person, place, and time           Vital Signs  ED Triage Vitals [03/06/22 1051]   Temperature Pulse Respirations Blood Pressure SpO2   (!) 97 °F (36 1 °C) 73 20 139/75 99 %      Temp Source Heart Rate Source Patient Position - Orthostatic VS BP Location FiO2 (%)   Temporal Monitor Lying Right arm --      Pain Score       --           Vitals:    03/06/22 1051 03/06/22 1100   BP: 139/75 139/75   Pulse: 73 73   Patient Position - Orthostatic VS: Lying Lying               ED Medications  Medications - No data to display    Diagnostic Studies  Results Reviewed     None                 XR ankle 3+ views RIGHT   ED Interpretation by Anne Hillman DO (03/06 1125)   Nondisplaced distal fibula fracture                 Procedures  Splint application    Date/Time: 3/6/2022 11:41 AM  Performed by: Anne Hillman DO  Authorized by: Anne Hillman DO   Universal Protocol:  Consent: Verbal consent obtained  Risks and benefits: risks, benefits and alternatives were discussed  Consent given by: patient  Patient understanding: patient states understanding of the procedure being performed  Required items: required blood products, implants, devices, and special equipment available  Patient identity confirmed: verbally with patient      Pre-procedure details:     Sensation:  Normal    Skin color:  Pink with echymoses  Procedure details:     Laterality:  Right    Location:  Ankle    Ankle:  R ankle    Strapping: no      Splint type: walking boot  Post-procedure details:     Pain:  Improved    Sensation:  Normal    Skin color:  Pink    Patient tolerance of procedure:   Tolerated well, no immediate complications             ED Course  ED Course as of 03/06/22 1142   Sun Mar 06, 2022   1139 Imaging findings concerning for distal fibula fracture which is nondisplaced, findings discussed with patient and daughter at bedside, patient placed in an orthopedic walking boot, advised to use her cane or walker which she already has at home, ice and elevate, follow up with Orthopedics in the next 1-2 days, patient has an established relationship with an orthopedist in [place], Dr Tim Trejo who has previously evaluated her for possible knee replacement surgery, therefore advised to follow up accordingly and a new referral was not placed today, patient advised to return if any additional concerns, patient and daughter acknowledged understanding and agreement with this plan                               SBIRT 20yo+      Most Recent Value   SBIRT (24 yo +)    In order to provide better care to our patients, we are screening all of our patients for alcohol and drug use  Would it be okay to ask you these screening questions? Yes Filed at: 03/06/2022 1054   Initial Alcohol Screen: US AUDIT-C     1  How often do you have a drink containing alcohol? 0 Filed at: 03/06/2022 1054   2  How many drinks containing alcohol do you have on a typical day you are drinking? 0 Filed at: 03/06/2022 1054   3a  Male UNDER 65: How often do you have five or more drinks on one occasion? 0 Filed at: 03/06/2022 1054   3b  FEMALE Any Age, or MALE 65+: How often do you have 4 or more drinks on one occassion? 0 Filed at: 03/06/2022 1054   Audit-C Score 0 Filed at: 03/06/2022 1054   LAURA: How many times in the past year have you    Used an illegal drug or used a prescription medication for non-medical reasons? Never Filed at: 03/06/2022 1054                      Disposition  Final diagnoses:   Fibula fracture     Time reflects when diagnosis was documented in both MDM as applicable and the Disposition within this note     Time User Action Codes Description Comment    3/6/2022 11:34 AM Seema Franco Add [S82 409A] Fibula fracture       ED Disposition     ED Disposition Condition Date/Time Comment    Discharge Stable Sun Mar 6, 2022 11:34 AM Marsh Gitelman discharge to home/self care              Follow-up Information     Follow up With Specialties Details Why [address], DO Family Medicine  As needed [address] [address] [phone]            Patient's Medications   Discharge Prescriptions    No medications on file       No discharge procedures on file      PDMP Review     None          ED Provider  Electronically Signed by           Shiloh Garrett DO  03/06/22 1140

## 2022-03-06 NOTE — DISCHARGE INSTRUCTIONS
Follow up with your Orthopedic Surgeon in 1-2 days  Ice and elevate to decrease swelling and pain  Return to the emergency department if symptoms worsen or any additional concerns

## 2022-03-24 ENCOUNTER — OFFICE VISIT (OUTPATIENT)
Dept: OBGYN CLINIC | Facility: CLINIC | Age: 87
End: 2022-03-24
Payer: COMMERCIAL

## 2022-03-24 ENCOUNTER — HOSPITAL ENCOUNTER (OUTPATIENT)
Dept: RADIOLOGY | Facility: CLINIC | Age: 87
Discharge: HOME | End: 2022-03-24
Payer: COMMERCIAL

## 2022-03-24 VITALS
TEMPERATURE: 97.4 F | BODY MASS INDEX: 29.32 KG/M2 | DIASTOLIC BLOOD PRESSURE: 68 MMHG | HEIGHT: 65 IN | HEART RATE: 76 BPM | SYSTOLIC BLOOD PRESSURE: 130 MMHG | WEIGHT: 176 LBS

## 2022-03-24 DIAGNOSIS — S82.831A OTHER CLOSED FRACTURE OF DISTAL END OF RIGHT FIBULA, INITIAL ENCOUNTER: ICD-10-CM

## 2022-03-24 DIAGNOSIS — M25.571 ACUTE RIGHT ANKLE PAIN: Primary | ICD-10-CM

## 2022-03-24 PROCEDURE — 99204 OFFICE O/P NEW MOD 45 MIN: CPT | Performed by: ORTHOPAEDIC SURGERY

## 2022-03-24 PROCEDURE — 73610 X-RAY EXAM OF ANKLE: CPT

## 2022-03-24 RX ORDER — OMEPRAZOLE 40 MG/1
40 CAPSULE, DELAYED RELEASE ORAL DAILY
COMMUNITY

## 2022-03-24 RX ORDER — WARFARIN SODIUM 3 MG/1
1.5 TABLET ORAL
COMMUNITY

## 2022-03-24 RX ORDER — TAMOXIFEN CITRATE 20 MG/1
20 TABLET ORAL 2 TIMES DAILY
COMMUNITY

## 2022-03-24 RX ORDER — NITROGLYCERIN 0.3 MG/1
0.3 TABLET SUBLINGUAL
COMMUNITY

## 2022-03-24 NOTE — PROGRESS NOTES
ASSESSMENT/PLAN:    Diagnoses and all orders for this visit:    Acute right ankle pain    Other closed fracture of distal end of right fibula, initial encounter  -     XR ankle 3+ vw right; Future      Plan:  I would recommend continued use of the walker cast boot  Ideally, this should not be removed  However, she has already been removing it routinely and ambulating without the boot in place  I have encouraged her to use the boot whenever she is ambulating  She may remove the boot for sleeping  I would strongly encourage her to consider putting the boot on if she needs to get to the bathroom at night so that she does not suffer a repeat injury, possibly leading to a displaced fracture and the need for surgical intervention  I will see her in 3 weeks for re-evaluation and x-rays of her right ankle will be obtained out of her boot to assess further healing  Her family was instructed to contact me if questions or concerns arise prior to scheduled follow-up  Return in about 3 weeks (around 4/14/2022)  _____________________________________________________  CHIEF COMPLAINT:  Chief Complaint   Patient presents with    Right Ankle - Fracture         SUBJECTIVE:  Jose Navas is a 82y o  year old female who presents for evaluation of her right ankle injured on 03/05/2022  She states that she was walking at home, her knees give out and she fell forward  She denies any ankle pain occurring on that day  When she awoke on 03/06/2022, she noted ankle pain and was taken to the emergency room where she was evaluated, x-rays obtained and she was placed into a walker cast boot  She presents today with the walker cast boot in place  She admits that she removes the boot on a routine basis and will ambulate without the boot in place  She states that she wears the boot about half the time  She has been wearing the boot more consistently over the last 2 days because she knew she was coming to the doctor  She denies paresthesias  She complains of lateral ankle pain  She denies history of prior injuries to her ankle unless there was an injury in the remote past     PAST MEDICAL HISTORY:  Past Medical History:   Diagnosis Date    Arthritis     [facility]     [facility]     Renal disorder        PAST SURGICAL HISTORY:  Past Surgical History:   Procedure Laterality Date    CORONARY ANGIOPLASTY WITH STENT PLACEMENT         FAMILY HISTORY:  History reviewed  No pertinent family history      SOCIAL HISTORY:  Social History     Tobacco Use    Smoking status: Never Smoker    Smokeless tobacco: Never Used   Vaping Use    Vaping Use: Never used   Substance Use Topics    Alcohol use: Never    Drug use: Never       MEDICATIONS:    Current Outpatient Medications:     amLODIPine-valsartan (EXFORGE) 5-160 MG per tablet, Take 1 tablet by mouth daily , Disp: , Rfl:     aspirin (ASPIRIN LOW DOSE) 81 mg EC tablet, Take by mouth, Disp: , Rfl:     carvedilol (COREG) 3 125 mg tablet, TAKE 1 TABLET TWICE A DAY WITH FOOD, Disp: , Rfl:     Cholecalciferol (Vitamin D3) 50 MCG (2000 UT) capsule, Take by mouth, Disp: , Rfl:     clotrimazole-betamethasone (LOTRISONE) 1-0 05 % cream, , Disp: , Rfl:     glipiZIDE (GLUCOTROL XL) 2 5 mg 24 hr tablet, TAKE 2 TABLETS IN THE MORNING & TAKE 2 TABLETS IN THE EVENING, Disp: , Rfl:     Insulin Degludec 100 UNIT/ML SOLN, Inject 6 Units under the skin daily, Disp: , Rfl:     meclizine (ANTIVERT) 25 mg tablet, One pill by mouth up to 3 times a day as needed for dizziness, Disp: , Rfl:     nitroglycerin (NITROSTAT) 0 3 mg SL tablet, Place 0 3 mg under the tongue every 5 (five) minutes as needed for chest pain, Disp: , Rfl:     omeprazole (PriLOSEC) 40 MG capsule, Take 40 mg by mouth daily, Disp: , Rfl:     tamoxifen (NOLVADEX) 20 mg tablet, Take 20 mg by mouth 2 (two) times a day, Disp: , Rfl:     warfarin (COUMADIN) 3 mg tablet, Take 1 5 mg by mouth daily, Disp: , Rfl:     ALLERGIES:  Allergies   Allergen Reactions    Calcium Channel Blockers      Patient unable to remember symptoms    Cephalexin      Reports hallucinations    Fluoxetine      Patient unable to remember symptoms    Guaifenesin & Derivatives Hives     Possible hives / urticaria      H2 Antagonists      Pepcid caused pain in stomach    Metoprolol      Patient unable to remember symptoms    Niacin Er     Nitrofurantoin     Quinolones      Patient unable to remember symptoms    Rofecoxib      LEG SWELLING      Sulfa Antibiotics GI Intolerance     rash    Welchol [Colesevelam]     Zetia [Ezetimibe]        Review of systems:   Constitutional: Negative for fatigue, fever or loss of apetite  HENT: Negative  Respiratory: Negative for shortness of breath, dyspnea  Cardiovascular: Negative for chest pain/tightness  Gastrointestinal: Negative for abdominal pain, N/V  Endocrine: Negative for cold/heat intolerance, unexplained weight loss/gain  Genitourinary: Negative for flank pain, dysuria, hematuria  Musculoskeletal:  Positive as in the HPI   Skin: Negative for rash  Neurological:  Negative  Psychiatric/Behavioral: Negative for agitation  _____________________________________________________  PHYSICAL EXAMINATION:    Blood pressure 130/68, pulse 76, temperature (!) 97 4 °F (36 3 °C), temperature source Temporal, height 5' 5" (1 651 m), weight 79 8 kg (176 lb)  General: well developed and well nourished, alert, oriented times 3 and appears comfortable  Psychiatric: Normal  HEENT: Benign  Cardiovascular: Regular    Pulmonary: No wheezing or stridor  Abdomen: Soft, Nontender  Skin: No masses, erythema, lacerations, fluctation, ulcerations  Neurovascular: Motor and sensory exams are grossly intact and pulses are palpable  MUSCULOSKELETAL EXAMINATION:    The right ankle exam demonstrated the boot in place upon arrival   This was removed without difficulty  The skin is intact  There is no bruising  There is no swelling  She has tenderness over the distal fibula  The distal tibia is nontender  She denies pain with ankle range of motion but has somewhat limited dorsiflexion  She has no tenderness to palpation of the bony and soft tissue structures throughout the foot  The remainder of the lower extremity examination is benign  _____________________________________________________  STUDIES REVIEWED:  Original x-rays from 03/06/2022 demonstrate a nondisplaced distal fibular fracture essentially seen only on 1 of the images  Repeat x-rays obtained today demonstrate the fracture to remain nondisplaced  Osteopenia is noted        Paloma Anger

## 2022-03-25 ENCOUNTER — APPOINTMENT (EMERGENCY)
Dept: RADIOLOGY | Facility: HOSPITAL | Age: 87
End: 2022-03-25
Payer: COMMERCIAL

## 2022-03-25 ENCOUNTER — HOSPITAL ENCOUNTER (EMERGENCY)
Facility: HOSPITAL | Age: 87
Discharge: HOME | End: 2022-03-25
Attending: EMERGENCY MEDICINE | Admitting: EMERGENCY MEDICINE
Payer: COMMERCIAL

## 2022-03-25 VITALS
WEIGHT: 176 LBS | TEMPERATURE: 97.5 F | DIASTOLIC BLOOD PRESSURE: 85 MMHG | HEART RATE: 74 BPM | BODY MASS INDEX: 29.32 KG/M2 | RESPIRATION RATE: 20 BRPM | HEIGHT: 65 IN | SYSTOLIC BLOOD PRESSURE: 184 MMHG | OXYGEN SATURATION: 98 %

## 2022-03-25 DIAGNOSIS — M47.816 OSTEOARTHRITIS OF LUMBAR SPINE: ICD-10-CM

## 2022-03-25 DIAGNOSIS — M54.50 ACUTE MIDLINE LOW BACK PAIN WITHOUT SCIATICA: Primary | ICD-10-CM

## 2022-03-25 LAB
BILIRUB UR QL STRIP: NEGATIVE
CLARITY UR: CLEAR
COLOR UR: YELLOW
GLUCOSE UR STRIP-MCNC: ABNORMAL MG/DL
HGB UR QL STRIP.AUTO: NEGATIVE
KETONES UR STRIP-MCNC: NEGATIVE MG/DL
LEUKOCYTE ESTERASE UR QL STRIP: NEGATIVE
NITRITE UR QL STRIP: NEGATIVE
PH UR STRIP.AUTO: 5 [PH]
PROT UR STRIP-MCNC: NEGATIVE MG/DL
SP GR UR STRIP.AUTO: 1.01 (ref 1–1.03)
UROBILINOGEN UR QL STRIP.AUTO: 1 E.U./DL

## 2022-03-25 PROCEDURE — 81003 URINALYSIS AUTO W/O SCOPE: CPT | Performed by: EMERGENCY MEDICINE

## 2022-03-25 PROCEDURE — 99284 EMERGENCY DEPT VISIT MOD MDM: CPT | Performed by: EMERGENCY MEDICINE

## 2022-03-25 PROCEDURE — 99284 EMERGENCY DEPT VISIT MOD MDM: CPT

## 2022-03-25 PROCEDURE — 72100 X-RAY EXAM L-S SPINE 2/3 VWS: CPT

## 2022-03-25 RX ORDER — LIDOCAINE 50 MG/G
1 PATCH TOPICAL ONCE
Status: DISCONTINUED | OUTPATIENT
Start: 2022-03-25 | End: 2022-03-26 | Stop reason: HOSPADM

## 2022-03-25 RX ORDER — HYDROCODONE BITARTRATE AND ACETAMINOPHEN 5; 325 MG/1; MG/1
0.5 TABLET ORAL EVERY 6 HOURS PRN
Qty: 8 TABLET | Refills: 0 | Status: SHIPPED | OUTPATIENT
Start: 2022-03-25 | End: 2022-03-29

## 2022-03-25 RX ADMIN — LIDOCAINE 1 PATCH: 50 PATCH TOPICAL at 21:42

## 2022-03-26 NOTE — ED PROVIDER NOTES
History  Chief Complaint   Patient presents with    Back Pain     pt c/o R sided lower back pain since tuesday  pt has had a few recent falls and also has a hx of kidney stones     Patient is an 68-year-old female brought to the emergency department by daughter for complaints of low back pain that is been bothering her for the past week, patient states that she lost her balance small on the toilet causing her to fall sideways and bumping into the wall that was next to her, she did not have a head injury or loss of consciousness but feels as though she may have twisted her back in an awkward way to cause her current symptoms, she denies any actual fall or trauma, no numbness or tingling to extremities, no bowel or bladder dysfunction, she does report some urinary frequency          Prior to Admission Medications   Prescriptions Last Dose Informant Patient Reported? Taking?    Cholecalciferol (Vitamin D3) 50 MCG (2000 UT) capsule   Yes No   Sig: Take by mouth   Insulin Degludec 100 UNIT/ML SOLN   Yes No   Sig: Inject 6 Units under the skin daily   amLODIPine-valsartan (EXFORGE) 5-160 MG per tablet   Yes No   Sig: Take 1 tablet by mouth daily    aspirin (ASPIRIN LOW DOSE) 81 mg EC tablet   Yes No   Sig: Take by mouth   carvedilol (COREG) 3 125 mg tablet   Yes No   Sig: TAKE 1 TABLET TWICE A DAY WITH FOOD   clotrimazole-betamethasone (LOTRISONE) 1-0 05 % cream   Yes No   glipiZIDE (GLUCOTROL XL) 2 5 mg 24 hr tablet   Yes No   Sig: TAKE 2 TABLETS IN THE MORNING & TAKE 2 TABLETS IN THE EVENING   meclizine (ANTIVERT) 25 mg tablet   Yes No   Sig: One pill by mouth up to 3 times a day as needed for dizziness   nitroglycerin (NITROSTAT) 0 3 mg SL tablet   Yes No   Sig: Place 0 3 mg under the tongue every 5 (five) minutes as needed for chest pain   omeprazole (PriLOSEC) 40 MG capsule   Yes No   Sig: Take 40 mg by mouth daily   tamoxifen (NOLVADEX) 20 mg tablet   Yes No   Sig: Take 20 mg by mouth 2 (two) times a day   warfarin (COUMADIN) 3 mg tablet   Yes No   Sig: Take 1 5 mg by mouth daily      Facility-Administered Medications: None       Past Medical History:   Diagnosis Date    Arthritis     Diabetes mellitus ([facility] )     Myocardial infarct ([facility] )     Renal disorder        Past Surgical History:   Procedure Laterality Date    CORONARY ANGIOPLASTY WITH STENT PLACEMENT         History reviewed  No pertinent family history  I have reviewed and agree with the history as documented  E-Cigarette/Vaping    E-Cigarette Use Never User      E-Cigarette/Vaping Substances     Social History     Tobacco Use    Smoking status: Never Smoker    Smokeless tobacco: Never Used   Vaping Use    Vaping Use: Never used   Substance Use Topics    Alcohol use: Never    Drug use: Never       Review of Systems   Constitutional: Negative  HENT: Negative  Eyes: Negative  Respiratory: Negative  Cardiovascular: Negative  Gastrointestinal: Negative  Endocrine: Negative  Genitourinary: Negative  Musculoskeletal: Positive for back pain  Skin: Negative  Allergic/Immunologic: Negative  Neurological: Negative  Hematological: Negative  Psychiatric/Behavioral: Negative  Physical Exam  Physical Exam  Constitutional:       Appearance: She is well-developed  HENT:      Head: Normocephalic and atraumatic  Eyes:      Conjunctiva/sclera: Conjunctivae normal       Pupils: Pupils are equal, round, and reactive to light  Cardiovascular:      Rate and Rhythm: Normal rate  Pulmonary:      Effort: Pulmonary effort is normal    Abdominal:      Palpations: Abdomen is soft  Musculoskeletal:         General: Normal range of motion  Cervical back: Normal range of motion and neck supple          Back:       Comments: Ortho boot in place on right foot    Tenderness to palpate in the paraspinal musculature of the lumbar spine, no bony deformity or step-off, distal sensation and motor is intact   Skin:     General: Skin is warm and dry  Neurological:      Mental Status: She is alert and oriented to person, place, and time           Vital Signs  ED Triage Vitals   Temperature Pulse Respirations Blood Pressure SpO2   03/25/22 2129 03/25/22 2129 03/25/22 2129 03/25/22 2129 03/25/22 2129   97 5 °F (36 4 °C) 74 20 (!) 184/85 98 %      Temp src Heart Rate Source Patient Position - Orthostatic VS BP Location FiO2 (%)   -- 03/25/22 2129 03/25/22 2129 03/25/22 2129 --    Monitor Sitting Right arm       Pain Score       03/25/22 2251       5           Vitals:    03/25/22 2129   BP: (!) 184/85   Pulse: 74   Patient Position - Orthostatic VS: Sitting               ED Medications  Medications - No data to display    Diagnostic Studies  Results Reviewed     Procedure Component Value Units Date/Time    UA w Reflex to Microscopic w Reflex to Culture [[id number]]  (Abnormal) Collected: 03/25/22 2213    Lab Status: Final result Specimen: Urine Updated: 03/25/22 2215     Color, UA Yellow     Clarity, UA Clear     Specific Gravity, UA 1 015     pH, UA 5 0     Leukocytes, UA Negative     Nitrite, UA Negative     Protein, UA Negative mg/dl      Glucose,  (1/2%) mg/dl      Ketones, UA Negative mg/dl      Urobilinogen, UA 1 0 E U /dl      Bilirubin, UA Negative     Blood, UA Negative                 XR lumbar spine 2 or 3 views    (Results Pending)                   ED Course  ED Course as of 03/26/22 0120   Sat Mar 26, 2022   0119 Urinalysis unremarkable except for glucose, patient is a known diabetic, she also currently takes Coumadin and therefore is limited to Tylenol for pain control at home, I did offer her additional pain medication in the emergency room which she declined, she was agreeable to a Lidoderm patch but admits this did not give her any relief of pain, imaging findings were discussed at bedside with patient and daughter, consistent with degenerative changes, likely the cause of some of her symptoms, therefore she was discharged home with instructions for follow-up and advised to return if any additional concerns, patient acknowledges understanding and agreement with this plan                               SBIRT 20yo+      Most Recent Value   SBIRT (22 yo +)    In order to provide better care to our patients, we are screening all of our patients for alcohol and drug use  Would it be okay to ask you these screening questions? Yes Filed at: 03/25/2022 2152   Initial Alcohol Screen: US AUDIT-C     1  How often do you have a drink containing alcohol? 0 Filed at: 03/25/2022 2152   2  How many drinks containing alcohol do you have on a typical day you are drinking? 0 Filed at: 03/25/2022 2152   3a  Male UNDER 65: How often do you have five or more drinks on one occasion? 0 Filed at: 03/25/2022 2152   3b  FEMALE Any Age, or MALE 65+: How often do you have 4 or more drinks on one occassion? 0 Filed at: 03/25/2022 2152   Audit-C Score 0 Filed at: 03/25/2022 2152   LAURA: How many times in the past year have you    Used an illegal drug or used a prescription medication for non-medical reasons? Never Filed at: 03/25/2022 2152                      Disposition  Final diagnoses:   Acute midline low back pain without sciatica   Osteoarthritis of lumbar spine     Time reflects when diagnosis was documented in both MDM as applicable and the Disposition within this note     Time User Action Codes Description Comment    3/25/2022 10:35 PM Nadira De Guzman Add [M54 50] Acute midline low back pain without sciatica     3/25/2022 10:35 PM Nadira Ore Add [J55 502] Osteoarthritis of lumbar spine       ED Disposition     ED Disposition Condition Date/Time Comment    Discharge Stable Fri Mar 25, 2022 10:35 PM Dank Palacios discharge to home/self care              Follow-up Information     Follow up With Specialties Details Why [address], DO Family Medicine In 2 days As needed [address] [facility]  [address] [facility]  [phone] Discharge Medication List as of 3/25/2022 10:37 PM      START taking these medications    Details   HYDROcodone-acetaminophen (Norco) 5-325 mg per tablet Take 0 5 tablets by mouth every 6 (six) hours as needed for pain for up to 4 days Max Daily Amount: 2 tablets, Starting Fri 3/25/2022, Until Tue 3/29/2022 at 2359, Normal         CONTINUE these medications which have NOT CHANGED    Details   amLODIPine-valsartan (EXFORGE) 5-160 MG per tablet Take 1 tablet by mouth daily , Starting Thu 2/8/2018, Historical Med      aspirin (ASPIRIN LOW DOSE) 81 mg EC tablet Take by mouth, Historical Med      carvedilol (COREG) 3 125 mg tablet TAKE 1 TABLET TWICE A DAY WITH FOOD, Historical Med      Cholecalciferol (Vitamin D3) 50 MCG (2000 UT) capsule Take by mouth, Starting Mon 3/9/2015, Historical Med      clotrimazole-betamethasone (LOTRISONE) 1-0 05 % cream Starting Mon 12/18/2017, Historical Med      glipiZIDE (GLUCOTROL XL) 2 5 mg 24 hr tablet TAKE 2 TABLETS IN THE MORNING & TAKE 2 TABLETS IN THE EVENING, Historical Med      Insulin Degludec 100 UNIT/ML SOLN Inject 6 Units under the skin daily, Historical Med      meclizine (ANTIVERT) 25 mg tablet One pill by mouth up to 3 times a day as needed for dizziness, Historical Med      nitroglycerin (NITROSTAT) 0 3 mg SL tablet Place 0 3 mg under the tongue every 5 (five) minutes as needed for chest pain, Historical Med      omeprazole (PriLOSEC) 40 MG capsule Take 40 mg by mouth daily, Historical Med      tamoxifen (NOLVADEX) 20 mg tablet Take 20 mg by mouth 2 (two) times a day, Historical Med      warfarin (COUMADIN) 3 mg tablet Take 1 5 mg by mouth daily, Historical Med             No discharge procedures on file      PDMP Review     None          ED Provider  Electronically Signed by           Connor Hernandez DO  03/26/22 4908

## 2022-03-28 ENCOUNTER — APPOINTMENT (EMERGENCY)
Dept: CT IMAGING | Facility: HOSPITAL | Age: 87
End: 2022-03-28
Payer: COMMERCIAL

## 2022-03-28 ENCOUNTER — HOSPITAL ENCOUNTER (EMERGENCY)
Facility: HOSPITAL | Age: 87
Discharge: HOME | End: 2022-03-28
Attending: EMERGENCY MEDICINE | Admitting: EMERGENCY MEDICINE
Payer: COMMERCIAL

## 2022-03-28 VITALS
SYSTOLIC BLOOD PRESSURE: 181 MMHG | DIASTOLIC BLOOD PRESSURE: 74 MMHG | OXYGEN SATURATION: 96 % | HEART RATE: 67 BPM | WEIGHT: 163.14 LBS | BODY MASS INDEX: 27.15 KG/M2 | RESPIRATION RATE: 18 BRPM | TEMPERATURE: 98.1 F

## 2022-03-28 DIAGNOSIS — E27.8 ADRENAL NODULE (HCC): ICD-10-CM

## 2022-03-28 DIAGNOSIS — R91.1 PULMONARY NODULE: ICD-10-CM

## 2022-03-28 DIAGNOSIS — N18.9 CKD (CHRONIC KIDNEY DISEASE): ICD-10-CM

## 2022-03-28 DIAGNOSIS — C50.919 BREAST CANCER (HCC): ICD-10-CM

## 2022-03-28 DIAGNOSIS — M54.50 ACUTE LOW BACK PAIN: Primary | ICD-10-CM

## 2022-03-28 DIAGNOSIS — K59.00 CONSTIPATION: ICD-10-CM

## 2022-03-28 DIAGNOSIS — N85.00 ENDOMETRIAL HYPERPLASIA: ICD-10-CM

## 2022-03-28 LAB
ALBUMIN SERPL BCP-MCNC: 3.3 G/DL (ref 3.5–5)
ALP SERPL-CCNC: 81 U/L (ref 46–116)
ALT SERPL W P-5'-P-CCNC: 26 U/L (ref 12–78)
ANION GAP SERPL CALCULATED.3IONS-SCNC: 7 MMOL/L (ref 4–13)
APTT PPP: 42 SECONDS (ref 23–37)
AST SERPL W P-5'-P-CCNC: 26 U/L (ref 5–45)
BASOPHILS # BLD AUTO: 0.05 THOUSANDS/ΜL (ref 0–0.1)
BASOPHILS NFR BLD AUTO: 1 % (ref 0–1)
BILIRUB SERPL-MCNC: 0.45 MG/DL (ref 0.2–1)
BILIRUB UR QL STRIP: NEGATIVE
BUN SERPL-MCNC: 25 MG/DL (ref 5–25)
CALCIUM ALBUM COR SERPL-MCNC: 9 MG/DL (ref 8.3–10.1)
CALCIUM SERPL-MCNC: 8.4 MG/DL (ref 8.3–10.1)
CHLORIDE SERPL-SCNC: 104 MMOL/L (ref 100–108)
CLARITY UR: CLEAR
CO2 SERPL-SCNC: 26 MMOL/L (ref 21–32)
COLOR UR: NORMAL
CREAT SERPL-MCNC: 1.64 MG/DL (ref 0.6–1.3)
EOSINOPHIL # BLD AUTO: 0.47 THOUSAND/ΜL (ref 0–0.61)
EOSINOPHIL NFR BLD AUTO: 8 % (ref 0–6)
ERYTHROCYTE [DISTWIDTH] IN BLOOD BY AUTOMATED COUNT: 13.2 % (ref 11.6–15.1)
GFR SERPL CREATININE-BSD FRML MDRD: 28 ML/MIN/1.73SQ M
GLUCOSE SERPL-MCNC: 159 MG/DL (ref 65–140)
GLUCOSE UR STRIP-MCNC: NEGATIVE MG/DL
HCT VFR BLD AUTO: 37 % (ref 34.8–46.1)
HGB BLD-MCNC: 12.1 G/DL (ref 11.5–15.4)
HGB UR QL STRIP.AUTO: NEGATIVE
IMM GRANULOCYTES # BLD AUTO: 0.03 THOUSAND/UL (ref 0–0.2)
IMM GRANULOCYTES NFR BLD AUTO: 1 % (ref 0–2)
INR PPP: 2.54 (ref 0.84–1.19)
KETONES UR STRIP-MCNC: NEGATIVE MG/DL
LACTATE SERPL-SCNC: 1 MMOL/L (ref 0.5–2)
LEUKOCYTE ESTERASE UR QL STRIP: NEGATIVE
LIPASE SERPL-CCNC: 71 U/L (ref 73–393)
LYMPHOCYTES # BLD AUTO: 1.75 THOUSANDS/ΜL (ref 0.6–4.47)
LYMPHOCYTES NFR BLD AUTO: 28 % (ref 14–44)
MCH RBC QN AUTO: 28.8 PG (ref 26.8–34.3)
MCHC RBC AUTO-ENTMCNC: 32.7 G/DL (ref 31.4–37.4)
MCV RBC AUTO: 88 FL (ref 82–98)
MONOCYTES # BLD AUTO: 0.74 THOUSAND/ΜL (ref 0.17–1.22)
MONOCYTES NFR BLD AUTO: 12 % (ref 4–12)
NEUTROPHILS # BLD AUTO: 3.16 THOUSANDS/ΜL (ref 1.85–7.62)
NEUTS SEG NFR BLD AUTO: 50 % (ref 43–75)
NITRITE UR QL STRIP: NEGATIVE
NRBC BLD AUTO-RTO: 0 /100 WBCS
PH UR STRIP.AUTO: 5.5 [PH]
PLATELET # BLD AUTO: 202 THOUSANDS/UL (ref 149–390)
PMV BLD AUTO: 10.3 FL (ref 8.9–12.7)
POTASSIUM SERPL-SCNC: 4.2 MMOL/L (ref 3.5–5.3)
PROT SERPL-MCNC: 6.8 G/DL (ref 6.4–8.2)
PROT UR STRIP-MCNC: NEGATIVE MG/DL
PROTHROMBIN TIME: 26.7 SECONDS (ref 11.6–14.5)
RBC # BLD AUTO: 4.2 MILLION/UL (ref 3.81–5.12)
SODIUM SERPL-SCNC: 137 MMOL/L (ref 136–145)
SP GR UR STRIP.AUTO: <=1.005 (ref 1–1.03)
UROBILINOGEN UR QL STRIP.AUTO: 0.2 E.U./DL
WBC # BLD AUTO: 6.2 THOUSAND/UL (ref 4.31–10.16)

## 2022-03-28 PROCEDURE — 85610 PROTHROMBIN TIME: CPT | Performed by: EMERGENCY MEDICINE

## 2022-03-28 PROCEDURE — 74176 CT ABD & PELVIS W/O CONTRAST: CPT

## 2022-03-28 PROCEDURE — 83690 ASSAY OF LIPASE: CPT | Performed by: EMERGENCY MEDICINE

## 2022-03-28 PROCEDURE — 81003 URINALYSIS AUTO W/O SCOPE: CPT | Performed by: EMERGENCY MEDICINE

## 2022-03-28 PROCEDURE — 99284 EMERGENCY DEPT VISIT MOD MDM: CPT | Performed by: EMERGENCY MEDICINE

## 2022-03-28 PROCEDURE — 96361 HYDRATE IV INFUSION ADD-ON: CPT

## 2022-03-28 PROCEDURE — 85025 COMPLETE CBC W/AUTO DIFF WBC: CPT | Performed by: EMERGENCY MEDICINE

## 2022-03-28 PROCEDURE — 99284 EMERGENCY DEPT VISIT MOD MDM: CPT

## 2022-03-28 PROCEDURE — 96360 HYDRATION IV INFUSION INIT: CPT

## 2022-03-28 PROCEDURE — 85730 THROMBOPLASTIN TIME PARTIAL: CPT | Performed by: EMERGENCY MEDICINE

## 2022-03-28 PROCEDURE — 36415 COLL VENOUS BLD VENIPUNCTURE: CPT | Performed by: EMERGENCY MEDICINE

## 2022-03-28 PROCEDURE — 83605 ASSAY OF LACTIC ACID: CPT | Performed by: EMERGENCY MEDICINE

## 2022-03-28 PROCEDURE — 80053 COMPREHEN METABOLIC PANEL: CPT | Performed by: EMERGENCY MEDICINE

## 2022-03-28 RX ORDER — POLYETHYLENE GLYCOL 3350 17 G/17G
17 POWDER, FOR SOLUTION ORAL DAILY
Qty: 119 G | Refills: 0 | Status: SHIPPED | OUTPATIENT
Start: 2022-03-28 | End: 2022-04-04

## 2022-03-28 RX ADMIN — SODIUM CHLORIDE 1000 ML: 0.9 INJECTION, SOLUTION INTRAVENOUS at 07:49

## 2022-03-28 NOTE — ED NOTES
Pt discharged, daughter and pt educated on discharge instructions  Offer no complaints or questions   Pt taken to vehicle in wheelchair       Allan Blanchard RN  03/28/22 1019

## 2022-03-28 NOTE — ED PROVIDER NOTES
History  Chief Complaint   Patient presents with    Back Pain     presents due to continued pain in lower back, pt here 3 days ago and dx with arthritis prescribed Norco and taking 1/2 tab w/o relief, pt also has had trouble having a BM since starting norco      Patient is an 44-year-old female with history of arthritis diabetes coronary artery disease presents the emergency department complaining of lower back pain abdominal pain and constipation patient reports no bowel movement since Tuesday history of colon cancer she is concerned about a bowel blockage  Patient recently started on hydrocodone for lower back pain which is still ongoing but also suspects this is contributing to her constipation not resolving with over-the-counter stool softeners  No vomiting no diarrhea no fevers or chills  History provided by:  Patient  Back Pain  Location:  Lumbar spine  Quality:  Aching and cramping  Onset quality:  Gradual  Duration:  3 days  Timing:  Constant  Progression:  Worsening  Associated symptoms: abdominal pain    Associated symptoms: no chest pain, no dysuria, no fever, no headaches, no numbness and no weakness        Prior to Admission Medications   Prescriptions Last Dose Informant Patient Reported? Taking?    Cholecalciferol (Vitamin D3) 50 MCG (2000 UT) capsule   Yes No   Sig: Take by mouth   HYDROcodone-acetaminophen (Norco) 5-325 mg per tablet   No No   Sig: Take 0 5 tablets by mouth every 6 (six) hours as needed for pain for up to 4 days Max Daily Amount: 2 tablets   Insulin Degludec 100 UNIT/ML SOLN   Yes No   Sig: Inject 6 Units under the skin daily   amLODIPine-valsartan (EXFORGE) 5-160 MG per tablet   Yes No   Sig: Take 1 tablet by mouth daily    aspirin (ASPIRIN LOW DOSE) 81 mg EC tablet   Yes No   Sig: Take by mouth   carvedilol (COREG) 3 125 mg tablet   Yes No   Sig: TAKE 1 TABLET TWICE A DAY WITH FOOD   clotrimazole-betamethasone (LOTRISONE) 1-0 05 % cream   Yes No   glipiZIDE (GLUCOTROL XL) 2 5 mg 24 hr tablet   Yes No   Sig: TAKE 2 TABLETS IN THE MORNING & TAKE 2 TABLETS IN THE EVENING   meclizine (ANTIVERT) 25 mg tablet   Yes No   Sig: One pill by mouth up to 3 times a day as needed for dizziness   nitroglycerin (NITROSTAT) 0 3 mg SL tablet   Yes No   Sig: Place 0 3 mg under the tongue every 5 (five) minutes as needed for chest pain   omeprazole (PriLOSEC) 40 MG capsule   Yes No   Sig: Take 40 mg by mouth daily   tamoxifen (NOLVADEX) 20 mg tablet   Yes No   Sig: Take 20 mg by mouth 2 (two) times a day   warfarin (COUMADIN) 3 mg tablet   Yes No   Sig: Take 1 5 mg by mouth daily      Facility-Administered Medications: None       Past Medical History:   Diagnosis Date    Arthritis     Diabetes mellitus ([facility] )     Myocardial infarct ([facility] )     Renal disorder        Past Surgical History:   Procedure Laterality Date    CORONARY ANGIOPLASTY WITH STENT PLACEMENT         History reviewed  No pertinent family history  I have reviewed and agree with the history as documented  E-Cigarette/Vaping    E-Cigarette Use Never User      E-Cigarette/Vaping Substances     Social History     Tobacco Use    Smoking status: Never Smoker    Smokeless tobacco: Never Used   Vaping Use    Vaping Use: Never used   Substance Use Topics    Alcohol use: Never    Drug use: Never       Review of Systems   Constitutional: Negative for activity change, appetite change, chills, fatigue and fever  HENT: Negative for congestion, ear pain, rhinorrhea and sore throat  Eyes: Negative for discharge, redness and visual disturbance  Respiratory: Negative for cough, chest tightness, shortness of breath and wheezing  Cardiovascular: Negative for chest pain and palpitations  Gastrointestinal: Positive for abdominal pain and constipation  Negative for diarrhea, nausea and vomiting  Endocrine: Negative for polydipsia and polyuria     Genitourinary: Negative for difficulty urinating, dysuria, frequency, hematuria and urgency  Musculoskeletal: Positive for back pain  Negative for arthralgias and myalgias  Skin: Negative for color change, pallor and rash  Neurological: Negative for dizziness, weakness, light-headedness, numbness and headaches  Hematological: Negative for adenopathy  Does not bruise/bleed easily  All other systems reviewed and are negative  Physical Exam  Physical Exam  Vitals and nursing note reviewed  Constitutional:       Appearance: She is well-developed  HENT:      Head: Normocephalic and atraumatic  Right Ear: External ear normal       Left Ear: External ear normal       Nose: Nose normal    Eyes:      Conjunctiva/sclera: Conjunctivae normal       Pupils: Pupils are equal, round, and reactive to light  Cardiovascular:      Rate and Rhythm: Normal rate and regular rhythm  Heart sounds: Normal heart sounds  Pulmonary:      Effort: Pulmonary effort is normal  No respiratory distress  Breath sounds: Normal breath sounds  No wheezing or rales  Chest:      Chest wall: No tenderness  Abdominal:      General: Bowel sounds are normal  There is distension  Palpations: Abdomen is soft  Tenderness: There is abdominal tenderness  There is no guarding or rebound  Musculoskeletal:         General: Normal range of motion  Cervical back: Normal range of motion and neck supple  Lumbar back: Spasms and tenderness present  Skin:     General: Skin is warm and dry  Neurological:      Mental Status: She is alert and oriented to person, place, and time  Cranial Nerves: No cranial nerve deficit  Sensory: No sensory deficit           Vital Signs  ED Triage Vitals   Temperature Pulse Respirations Blood Pressure SpO2   03/28/22 0729 03/28/22 0729 03/28/22 0729 03/28/22 0733 03/28/22 0729   98 1 °F (36 7 °C) 71 18 (!) 180/79 97 %      Temp Source Heart Rate Source Patient Position - Orthostatic VS BP Location FiO2 (%)   03/28/22 0729 -- 03/28/22 0729 03/28/22 7468 --   Temporal  Sitting Right arm       Pain Score       03/28/22 0729       10 - Worst Possible Pain           Vitals:    03/28/22 0729 03/28/22 0733   BP:  (!) 180/79   Pulse: 71    Patient Position - Orthostatic VS: Sitting          Visual Acuity      ED Medications  Medications   sodium chloride 0 9 % bolus 1,000 mL (1,000 mL Intravenous New Bag 3/28/22 0749)       Diagnostic Studies  Results Reviewed     Procedure Component Value Units Date/Time    UA w Reflex to Microscopic w Reflex to Culture [[id number]] Collected: 03/28/22 0847    Lab Status: Final result Specimen: Urine, Straight Cath Updated: 03/28/22 0858     Color, UA Straw     Clarity, UA Clear     Specific Gravity, UA <=1 005     pH, UA 5 5     Leukocytes, UA Negative     Nitrite, UA Negative     Protein, UA Negative mg/dl      Glucose, UA Negative mg/dl      Ketones, UA Negative mg/dl      Urobilinogen, UA 0 2 E U /dl      Bilirubin, UA Negative     Blood, UA Negative    Lactic acid [[id number]]  (Normal) Collected: 03/28/22 0756    Lab Status: Final result Specimen: Blood from Arm, Right Updated: 03/28/22 2168     LACTIC ACID 1 0 mmol/L     Narrative:      Result may be elevated if tourniquet was used during collection      Comprehensive metabolic panel [[id number]]  (Abnormal) Collected: 03/28/22 0756    Lab Status: Final result Specimen: Blood from Arm, Right Updated: 03/28/22 0816     Sodium 137 mmol/L      Potassium 4 2 mmol/L      Chloride 104 mmol/L      CO2 26 mmol/L      ANION GAP 7 mmol/L      BUN 25 mg/dL      Creatinine 1 64 mg/dL      Glucose 159 mg/dL      Calcium 8 4 mg/dL      Corrected Calcium 9 0 mg/dL      AST 26 U/L      ALT 26 U/L      Alkaline Phosphatase 81 U/L      Total Protein 6 8 g/dL      Albumin 3 3 g/dL      Total Bilirubin 0 45 mg/dL      eGFR 28 ml/min/1 73sq m     Narrative:      [place] guidelines for Chronic Kidney Disease (CKD):     Stage 1 with normal or high GFR (GFR > 90 mL/min/1 73 square meters)    Stage 2 Mild CKD (GFR = 60-89 mL/min/1 73 square meters)    Stage 3A Moderate CKD (GFR = 45-59 mL/min/1 73 square meters)    Stage 3B Moderate CKD (GFR = 30-44 mL/min/1 73 square meters)    Stage 4 Severe CKD (GFR = 15-29 mL/min/1 73 square meters)    Stage 5 End Stage CKD (GFR <15 mL/min/1 73 square meters)  Note: GFR calculation is accurate only with a steady state creatinine    Lipase [[id number]]  (Abnormal) Collected: 03/28/22 0756    Lab Status: Final result Specimen: Blood from Arm, Right Updated: 03/28/22 0816     Lipase 71 u/L     Protime-INR [[id number]]  (Abnormal) Collected: 03/28/22 0756    Lab Status: Final result Specimen: Blood from Arm, Right Updated: 03/28/22 0813     Protime 26 7 seconds      INR 2 54    APTT [[id number]]  (Abnormal) Collected: 03/28/22 0756    Lab Status: Final result Specimen: Blood from Arm, Right Updated: 03/28/22 0813     PTT 42 seconds     CBC and differential [[id number]]  (Abnormal) Collected: 03/28/22 0756    Lab Status: Final result Specimen: Blood from Arm, Right Updated: 03/28/22 0759     WBC 6 20 Thousand/uL      RBC 4 20 Million/uL      Hemoglobin 12 1 g/dL      Hematocrit 37 0 %      MCV 88 fL      MCH 28 8 pg      MCHC 32 7 g/dL      RDW 13 2 %      MPV 10 3 fL      Platelets 303 Thousands/uL      nRBC 0 /100 WBCs      Neutrophils Relative 50 %      Immat GRANS % 1 %      Lymphocytes Relative 28 %      Monocytes Relative 12 %      Eosinophils Relative 8 %      Basophils Relative 1 %      Neutrophils Absolute 3 16 Thousands/µL      Immature Grans Absolute 0 03 Thousand/uL      Lymphocytes Absolute 1 75 Thousands/µL      Monocytes Absolute 0 74 Thousand/µL      Eosinophils Absolute 0 47 Thousand/µL      Basophils Absolute 0 05 Thousands/µL                  CT recon only lumbar spine   Final Result by Manfred Castle MD (03/28 0952)      No acute osseous abnormality of lumbar spine        Small sclerotic lesion in L4 spinous process, indeterminate but appears new since 8/21/2013  Tiny sclerotic lesion in L5 spinous process  Difficult to exclude osseous metastasis  Degenerative findings as detailed above  Please see same-day CT abdomen pelvis without contrast for further evaluation  The study was marked in [facility] for immediate notification  Workstation performed: [id number]         CT abdomen pelvis wo contrast   Final Result by Helen Patel MD (03/28 0310)      No acute abnormality in abdomen or pelvis  New 1 4 cm thickening of endometrial cavity  Differential includes endometrial carcinoma, endometrial hyperplasia, tamoxifen use, among other differentials  Consider follow-up pelvic ultrasound  Partially imaged 4 1 cm lobulated mass in right breast with adjacent right breast skin thickening, compatible with biopsy-proven breast malignancy on care everywhere 6/24/2020       0 9 cm right lower lobe pulmonary nodule, indeterminate  Consider follow-up CT chest       1 1 cm left adrenal gland nodule, indeterminate but unchanged since 8/21/2013  Consider follow-up CT or MR adrenal mass protocol with and without contrast for further evaluation  Additional chronic/incidental findings as detailed above  The study was marked in [facility] for immediate notification  Workstation performed: [id number]                    Procedures  Procedures         ED Course                               SBIRT 20yo+      Most Recent Value   SBIRT (23 yo +)    In order to provide better care to our patients, we are screening all of our patients for alcohol and drug use  Would it be okay to ask you these screening questions? Yes Filed at: 03/28/2022 0499   Initial Alcohol Screen: US AUDIT-C     1  How often do you have a drink containing alcohol? 0 Filed at: 03/28/2022 0735   2  How many drinks containing alcohol do you have on a typical day you are drinking? 0 Filed at: 03/28/2022 0735   3a  Male UNDER 65:  How often do you have five or more drinks on one occasion? 0 Filed at: 03/28/2022 0735   3b  FEMALE Any Age, or MALE 65+: How often do you have 4 or more drinks on one occassion? 0 Filed at: 03/28/2022 0735   Audit-C Score 0 Filed at: 03/28/2022 1209   LAURA: How many times in the past year have you    Used an illegal drug or used a prescription medication for non-medical reasons? Never Filed at: 03/28/2022 6213                    MDM  Number of Diagnoses or Management Options  Acute low back pain: new and requires workup  Adrenal nodule [facility]): new and requires workup  Breast cancer [facility]): new and requires workup  CKD (chronic kidney disease): new and requires workup  Constipation: new and requires workup  Endometrial hyperplasia: new and requires workup  Pulmonary nodule: new and requires workup  Diagnosis management comments: Patient remained clinically and hemodynamically stable in the emergency department she is afebrile nontoxic well-appearing felt improved with rest and fluids given in the ED  Suspect some acute constipation from hydrocodone use at this point advised MiraLax for constipation and supportive care and plenty of fluids advised patient and family member of all incidental finding seen on CT scan also provided with paperwork and information reflecting these findings and recommended follow-up with PCP for further evaluation and obtaining these test results and further testing as indicated based on incidental radiology findings  Patient and family understand instructions and agree and will follow up as advised recommended supportive care for back pain and follow-up with PCP for this as well return precautions and anticipatory guidance discussed           Amount and/or Complexity of Data Reviewed  Clinical lab tests: ordered and reviewed  Tests in the radiology section of CPT®: ordered and reviewed  Tests in the medicine section of CPT®: ordered and reviewed  Decide to obtain previous medical records or to obtain history from someone other than the patient: yes  Review and summarize past medical records: yes  Independent visualization of images, tracings, or specimens: yes    Risk of Complications, Morbidity, and/or Mortality  Presenting problems: moderate  Diagnostic procedures: moderate  Management options: moderate    Patient Progress  Patient progress: stable      Disposition  Final diagnoses:   Acute low back pain - Sclerotic lesion L4 spinous process   Constipation   Adrenal nodule (HCC)   Pulmonary nodule   [facility] cancer ([facility] )   Endometrial hyperplasia - Thickening recommend follow-up ultrasound   CKD (chronic kidney disease)     Time reflects when diagnosis was documented in both MDM as applicable and the Disposition within this note     Time User Action Codes Description Comment    3/28/2022  9:02 AM Sandra Fent Add [M54 50] Acute low back pain     3/28/2022  9:02 AM Valma Eisenmenger, Todd Add [K59 00] Constipation     3/28/2022  9:02 AM Sandra Fent Add [E27 8] Adrenal nodule ([facility] )     3/28/2022  9:02 AM Sandra Fent Add [R91 1] Pulmonary nodule     3/28/2022  9:02 AM Sandra Fent Add [C50 919] Breast cancer ([facility] )     3/28/2022  9:03 AM Sandra Fent Add [R93 89] Endometrial thickening on ultrasound     3/28/2022  9:03 AM Sandra Fent Remove [R93 89] Endometrial thickening on ultrasound     3/28/2022  9:03 AM Sandra Fent Add [N85 00] Endometrial hyperplasia     3/28/2022  9:03 AM Sandra Fent Modify [N85 00] Endometrial hyperplasia Thickening recommend follow-up ultrasound    3/28/2022  9:14 AM Brain Mata Modify [M54 50] Acute low back pain Sclerotic lesion L4 spinous process    3/28/2022  9:15 AM Brain Mata Add [N18 9] CKD (chronic kidney disease)       ED Disposition     ED Disposition Condition Date/Time Comment    Discharge Stable Mon Mar 28, 2022  9:03 AM Daisy Villalpando discharge to home/self care              Follow-up Information     Follow up With Specialties Details Why Contact Info Juan C Ponce DO Family Medicine Schedule an appointment as soon as possible for a visit in 3 days  MAGEN [address]ha [address] [employer] [place] [address]  [phone]            Patient's Medications   Discharge Prescriptions    POLYETHYLENE GLYCOL (MIRALAX) 17 G PACKET    Take 17 g by mouth daily for 7 days       Start Date: 3/28/2022 End Date: 4/4/2022       Order Dose: 17 g       Quantity: 119 g    Refills: 0       No discharge procedures on file      PDMP Review     None          ED Provider  Electronically Signed by           Karen Benjamin DO  03/28/22 6039

## 2022-04-04 ENCOUNTER — HOSPITAL ENCOUNTER (EMERGENCY)
Facility: HOSPITAL | Age: 87
Discharge: HOME | End: 2022-04-04
Attending: EMERGENCY MEDICINE
Payer: COMMERCIAL

## 2022-04-04 VITALS
DIASTOLIC BLOOD PRESSURE: 91 MMHG | RESPIRATION RATE: 18 BRPM | BODY MASS INDEX: 27.15 KG/M2 | OXYGEN SATURATION: 99 % | HEIGHT: 65 IN | HEART RATE: 71 BPM | TEMPERATURE: 98.1 F | SYSTOLIC BLOOD PRESSURE: 138 MMHG

## 2022-04-04 DIAGNOSIS — M54.9 BACK PAIN: Primary | ICD-10-CM

## 2022-04-04 PROCEDURE — 96372 THER/PROPH/DIAG INJ SC/IM: CPT

## 2022-04-04 PROCEDURE — 99283 EMERGENCY DEPT VISIT LOW MDM: CPT

## 2022-04-04 PROCEDURE — 99285 EMERGENCY DEPT VISIT HI MDM: CPT | Performed by: EMERGENCY MEDICINE

## 2022-04-04 RX ORDER — LIDOCAINE 50 MG/G
1 PATCH TOPICAL ONCE
Status: DISCONTINUED | OUTPATIENT
Start: 2022-04-04 | End: 2022-04-04 | Stop reason: HOSPADM

## 2022-04-04 RX ORDER — FENTANYL CITRATE 50 UG/ML
50 INJECTION, SOLUTION INTRAMUSCULAR; INTRAVENOUS ONCE
Status: COMPLETED | OUTPATIENT
Start: 2022-04-04 | End: 2022-04-04

## 2022-04-04 RX ORDER — LIDOCAINE 50 MG/G
1 PATCH TOPICAL DAILY PRN
Qty: 15 PATCH | Refills: 0 | Status: SHIPPED | OUTPATIENT
Start: 2022-04-04

## 2022-04-04 RX ADMIN — FENTANYL CITRATE 50 MCG: 50 INJECTION INTRAMUSCULAR; INTRAVENOUS at 07:18

## 2022-04-04 RX ADMIN — LIDOCAINE 1 PATCH: 50 PATCH TOPICAL at 07:19

## 2022-04-04 NOTE — ED PROVIDER NOTES
History  Chief Complaint   Patient presents with    Back Pain     pt has pain meds at home but does not want to take them due to constipation      Patient is an 82-year-old female presents the emergency department due to ongoing lower back pain was prescribed pain meds for this and saw PCP and follow up and was scheduled to have physical therapy pain still going on this morning not taking her pain medicine prescribed due to it causing constipation  Trauma injury or fall no numbness or weakness no loss of bladder or bowel or urinary retention  History provided by:  Patient and relative  Back Pain  Location:  Lumbar spine  Quality:  Aching and cramping  Pain severity:  Moderate  Onset quality:  Gradual  Duration:  2 weeks  Timing:  Intermittent  Progression:  Waxing and waning  Chronicity:  Recurrent  Associated symptoms: no abdominal pain, no chest pain, no dysuria, no fever, no headaches, no numbness and no weakness        Prior to Admission Medications   Prescriptions Last Dose Informant Patient Reported? Taking?    Cholecalciferol (Vitamin D3) 50 MCG (2000 UT) capsule   Yes No   Sig: Take by mouth   Insulin Degludec 100 UNIT/ML SOLN   Yes No   Sig: Inject 6 Units under the skin daily   amLODIPine-valsartan (EXFORGE) 5-160 MG per tablet   Yes No   Sig: Take 1 tablet by mouth daily    aspirin (ASPIRIN LOW DOSE) 81 mg EC tablet   Yes No   Sig: Take by mouth   carvedilol (COREG) 3 125 mg tablet   Yes No   Sig: TAKE 1 TABLET TWICE A DAY WITH FOOD   clotrimazole-betamethasone (LOTRISONE) 1-0 05 % cream   Yes No   glipiZIDE (GLUCOTROL XL) 2 5 mg 24 hr tablet   Yes No   Sig: TAKE 2 TABLETS IN THE MORNING & TAKE 2 TABLETS IN THE EVENING   meclizine (ANTIVERT) 25 mg tablet   Yes No   Sig: One pill by mouth up to 3 times a day as needed for dizziness   nitroglycerin (NITROSTAT) 0 3 mg SL tablet   Yes No   Sig: Place 0 3 mg under the tongue every 5 (five) minutes as needed for chest pain   omeprazole (PriLOSEC) 40 MG capsule   Yes No   Sig: Take 40 mg by mouth daily   polyethylene glycol (MIRALAX) 17 g packet   No No   Sig: Take 17 g by mouth daily for 7 days   tamoxifen (NOLVADEX) 20 mg tablet   Yes No   Sig: Take 20 mg by mouth 2 (two) times a day   warfarin (COUMADIN) 3 mg tablet   Yes No   Sig: Take 1 5 mg by mouth daily      Facility-Administered Medications: None       Past Medical History:   Diagnosis Date    Arthritis     Diabetes mellitus ([facility] )     Myocardial infarct ([facility] )     Renal disorder        Past Surgical History:   Procedure Laterality Date    CORONARY ANGIOPLASTY WITH STENT PLACEMENT         No family history on file  I have reviewed and agree with the history as documented  E-Cigarette/Vaping    E-Cigarette Use Never User      E-Cigarette/Vaping Substances     Social History     Tobacco Use    Smoking status: Never Smoker    Smokeless tobacco: Never Used   Vaping Use    Vaping Use: Never used   Substance Use Topics    Alcohol use: Never    Drug use: Never       Review of Systems   Constitutional: Negative for activity change, appetite change, chills, fatigue and fever  HENT: Negative for congestion, ear pain, rhinorrhea and sore throat  Eyes: Negative for discharge, redness and visual disturbance  Respiratory: Negative for cough, chest tightness, shortness of breath and wheezing  Cardiovascular: Negative for chest pain and palpitations  Gastrointestinal: Positive for constipation  Negative for abdominal pain, diarrhea, nausea and vomiting  Endocrine: Negative for polydipsia and polyuria  Genitourinary: Negative for difficulty urinating, dysuria, frequency, hematuria and urgency  Musculoskeletal: Positive for back pain  Negative for arthralgias and myalgias  Skin: Negative for color change, pallor and rash  Neurological: Negative for dizziness, weakness, light-headedness, numbness and headaches  Hematological: Negative for adenopathy  Does not bruise/bleed easily     All other systems reviewed and are negative  Physical Exam  Physical Exam  Vitals and nursing note reviewed  Constitutional:       Appearance: She is well-developed  HENT:      Nose: Nose normal       Mouth/Throat:      Pharynx: No oropharyngeal exudate  Eyes:      General: No scleral icterus  Conjunctiva/sclera: Conjunctivae normal       Pupils: Pupils are equal, round, and reactive to light  Neck:      Vascular: No JVD  Trachea: No tracheal deviation  Cardiovascular:      Rate and Rhythm: Normal rate and regular rhythm  Heart sounds: Normal heart sounds  No murmur heard  Pulmonary:      Effort: Pulmonary effort is normal  No respiratory distress  Breath sounds: Normal breath sounds  No wheezing or rales  Abdominal:      General: Bowel sounds are normal       Palpations: Abdomen is soft  Tenderness: There is no abdominal tenderness  There is no guarding  Musculoskeletal:      Cervical back: Normal range of motion and neck supple  Lumbar back: Spasms and tenderness present  Positive right straight leg raise test and positive left straight leg raise test    Skin:     General: Skin is warm and dry  Neurological:      Mental Status: She is alert and oriented to person, place, and time  Cranial Nerves: No cranial nerve deficit  Sensory: No sensory deficit  Motor: No abnormal muscle tone        Comments: 5/5 motor, nl sens   Psychiatric:         Behavior: Behavior normal          Vital Signs  ED Triage Vitals   Temperature Pulse Respirations Blood Pressure SpO2   04/04/22 0652 04/04/22 0646 04/04/22 0646 04/04/22 0646 04/04/22 0646   98 1 °F (36 7 °C) 71 18 138/91 99 %      Temp src Heart Rate Source Patient Position - Orthostatic VS BP Location FiO2 (%)   -- -- 04/04/22 0646 04/04/22 0646 --     Lying Left arm       Pain Score       04/04/22 0718       3           Vitals:    04/04/22 0646   BP: 138/91   Pulse: 71   Patient Position - Orthostatic VS: Lying Visual Acuity      ED Medications  Medications   lidocaine (LIDODERM) 5 % patch 1 patch (1 patch Topical Medication Applied 4/4/22 2919)   fentanyl citrate (PF) 100 MCG/2ML 50 mcg (50 mcg Intramuscular Given 4/4/22 8345)       Diagnostic Studies  Results Reviewed     None                 No orders to display              Procedures  Procedures         ED Course                               SBIRT 22yo+      Most Recent Value   SBIRT (24 yo +)    In order to provide better care to our patients, we are screening all of our patients for alcohol and drug use  Would it be okay to ask you these screening questions? Yes Filed at: 04/04/2022 1606   Initial Alcohol Screen: US AUDIT-C     1  How often do you have a drink containing alcohol? 0 Filed at: 04/04/2022 0656   2  How many drinks containing alcohol do you have on a typical day you are drinking? 0 Filed at: 04/04/2022 0656   3a  Male UNDER 65: How often do you have five or more drinks on one occasion? 0 Filed at: 04/04/2022 0656   3b  FEMALE Any Age, or MALE 65+: How often do you have 4 or more drinks on one occassion? 0 Filed at: 04/04/2022 0656   Audit-C Score 0 Filed at: 04/04/2022 8032   LAURA: How many times in the past year have you    Used an illegal drug or used a prescription medication for non-medical reasons?  Never Filed at: 04/04/2022 2643                    MDM  Number of Diagnoses or Management Options  Back pain: new and requires workup  Diagnosis management comments: Back pain was improved with rest and medication given in the ED placed a Lidoderm patch as well and gave intramuscular analgesia with fentanyl patient was able to ambulate felt improved advised supportive care for back pain for now prescribed lidocaine patches as needed advised to avoid narcotic medication as much as possible and try to use Tylenol and supportive measures and follow up with PT and primary physician and also provided with referral for Pain and Spine Medicine follow-up return precautions and anticipatory guidance discussed  Amount and/or Complexity of Data Reviewed  Decide to obtain previous medical records or to obtain history from someone other than the patient: yes  Review and summarize past medical records: yes    Risk of Complications, Morbidity, and/or Mortality  Presenting problems: low  Management options: low    Patient Progress  Patient progress: stable      Disposition  Final diagnoses:   Back pain     Time reflects when diagnosis was documented in both MDM as applicable and the Disposition within this note     Time User Action Codes Description Comment    4/4/2022  7:32 AM Luisa Chan Add [M54 9] Back pain       ED Disposition     ED Disposition Condition Date/Time Comment    Discharge Stable Mon Apr 4, 2022  7:32 AM Katlyn Artis discharge to home/self care              Follow-up Information     Follow up With Specialties Details Why Contact Info    Arleen Stokes DO Family Medicine Schedule an appointment as soon as possible for a visit in 3 days  [address] [facility] [address]  [phone]      Efraín Neville MD Pain Medicine Schedule an appointment as soon as possible for a visit in 3 days  [place] [address]  [address] [facility]  [phone]            Patient's Medications   Discharge Prescriptions    LIDOCAINE (LIDODERM) 5 %    Apply 1 patch topically daily as needed (back pain) Remove & Discard patch within 12 hours or as directed by MD       Start Date: 4/4/2022  End Date: --       Order Dose: 1 patch       Quantity: 15 patch    Refills: 0           PDMP Review     None          ED Provider  Electronically Signed by           Lynn Stone DO  04/04/22 6662

## 2022-04-04 NOTE — ED NOTES
Ambulated pt with 2 assist, pt has some pain while ambulating but is able to ambulate      Xenia Zhao RN  04/04/22 3650

## 2022-04-26 ENCOUNTER — TELEPHONE (OUTPATIENT)
Dept: OBGYN CLINIC | Facility: MEDICAL CENTER | Age: 87
End: 2022-04-26

## 2022-04-26 NOTE — TELEPHONE ENCOUNTER
Chuck Woods at [facility] calling to confirm appointment with Dr Jayant Peters on 4/27/2022  Confirmed

## 2022-04-27 ENCOUNTER — OFFICE VISIT (OUTPATIENT)
Dept: OBGYN CLINIC | Facility: CLINIC | Age: 87
End: 2022-04-27
Payer: COMMERCIAL

## 2022-04-27 ENCOUNTER — HOSPITAL ENCOUNTER (OUTPATIENT)
Dept: RADIOLOGY | Facility: CLINIC | Age: 87
Discharge: HOME | End: 2022-04-27
Payer: COMMERCIAL

## 2022-04-27 VITALS
HEIGHT: 65 IN | SYSTOLIC BLOOD PRESSURE: 122 MMHG | DIASTOLIC BLOOD PRESSURE: 76 MMHG | WEIGHT: 163 LBS | HEART RATE: 55 BPM | TEMPERATURE: 97.1 F | BODY MASS INDEX: 27.16 KG/M2

## 2022-04-27 DIAGNOSIS — S82.831D OTHER CLOSED FRACTURE OF DISTAL END OF RIGHT FIBULA WITH ROUTINE HEALING, SUBSEQUENT ENCOUNTER: Primary | ICD-10-CM

## 2022-04-27 DIAGNOSIS — S82.831D OTHER CLOSED FRACTURE OF DISTAL END OF RIGHT FIBULA WITH ROUTINE HEALING, SUBSEQUENT ENCOUNTER: ICD-10-CM

## 2022-04-27 PROCEDURE — 73610 X-RAY EXAM OF ANKLE: CPT

## 2022-04-27 PROCEDURE — 99213 OFFICE O/P EST LOW 20 MIN: CPT | Performed by: ORTHOPAEDIC SURGERY

## 2022-04-27 RX ORDER — GABAPENTIN 100 MG/1
CAPSULE ORAL
COMMUNITY
Start: 2022-04-15

## 2022-04-27 NOTE — PROGRESS NOTES
Patient Name:  Milka Sullivan  MRN:  [id number]    Assessment     1  Other closed fracture of distal end of right fibula with routine healing, subsequent encounter  XR ankle 3+ vw right    Ankle Cude ankle/Ankle Brace       Plan     1  I would recommend follow-up in 4 weeks  Repeat x-rays will be obtained  She was provided with an ankle brace that should be worn when active but may be removed for inactivity and for cleansing  I have encouraged her or her daughter to contact us if questions or concerns arise  Return in about 4 weeks (around 5/25/2022)  Carolinedamari Farias returns for follow-up of her distal right fibular fracture  The patient is 7 week(s) post injury and returns for routine follow-up  Patient denies pain  She is anxious to get rid of the cast boot  She has been wearing this routinely although she admits that the nursing home where she was staying for period of time removed for cleansing  Objective     /76   Pulse 55   Temp (!) 97 1 °F (36 2 °C) (Temporal)   Ht 5' 5" (1 651 m)   Wt 73 9 kg (163 lb)   BMI 27 12 kg/m²     Exam of the ankle demonstrated the boot in place  This was removed without difficulty  She denies pain on the lateral ankle  Medial ankle nontender  She has no complaints with inversion or eversion stress  She has dorsiflexion to about 10° and plantar flexion about 20°  There is no significant swelling  Skin is warm and dry  There is no deformity  Data Review     I have personally reviewed pertinent films in PACS demonstrating healing of the fracture although the fracture site does remain visible      Madelaine Ulloa

## 2022-05-25 ENCOUNTER — OFFICE VISIT (OUTPATIENT)
Dept: OBGYN CLINIC | Facility: CLINIC | Age: 87
End: 2022-05-25
Payer: COMMERCIAL

## 2022-05-25 ENCOUNTER — HOSPITAL ENCOUNTER (OUTPATIENT)
Dept: RADIOLOGY | Facility: CLINIC | Age: 87
Discharge: HOME | End: 2022-05-25
Payer: COMMERCIAL

## 2022-05-25 VITALS
HEART RATE: 64 BPM | HEIGHT: 65 IN | SYSTOLIC BLOOD PRESSURE: 148 MMHG | TEMPERATURE: 97.8 F | WEIGHT: 163 LBS | BODY MASS INDEX: 27.16 KG/M2 | DIASTOLIC BLOOD PRESSURE: 64 MMHG

## 2022-05-25 DIAGNOSIS — S82.831D OTHER CLOSED FRACTURE OF DISTAL END OF RIGHT FIBULA WITH ROUTINE HEALING, SUBSEQUENT ENCOUNTER: ICD-10-CM

## 2022-05-25 DIAGNOSIS — M25.571 ACUTE RIGHT ANKLE PAIN: ICD-10-CM

## 2022-05-25 DIAGNOSIS — S82.831D OTHER CLOSED FRACTURE OF DISTAL END OF RIGHT FIBULA WITH ROUTINE HEALING, SUBSEQUENT ENCOUNTER: Primary | ICD-10-CM

## 2022-05-25 PROCEDURE — 99213 OFFICE O/P EST LOW 20 MIN: CPT | Performed by: ORTHOPAEDIC SURGERY

## 2022-05-25 PROCEDURE — 73610 X-RAY EXAM OF ANKLE: CPT

## 2022-05-25 RX ORDER — NITROGLYCERIN 0.4 MG/1
TABLET SUBLINGUAL
COMMUNITY
Start: 2022-04-26

## 2022-05-25 NOTE — PROGRESS NOTES
ASSESSMENT/PLAN:    Problem List Items Addressed This Visit        Musculoskeletal and Integument    Closed fracture of right distal fibula - Primary    Relevant Orders    XR ankle 3+ vw right       Other    Acute right ankle pain          X-rays taken today in office were reviewed and discussed with the patient today, which shows a well healed fracture  The patient was advised that she may wean from the cam boot/ankle brace as tolerated back to her normal footwear  She will return to the office as needed if any problems arise  Return if symptoms worsen or fail to improve       _____________________________________________________  CHIEF COMPLAINT:  Chief Complaint   Patient presents with    Right Ankle - Follow-up         SUBJECTIVE:  Josias Zhang is a 82 y o  female who presents for follow up of a distal right fibula fracture  The patient is now approximately 11 weeks post injury  Today the patient denies any pain, swelling, numbness or tingling in the lower extremity  The patient states that she had tried to wear the ankle cude brace, but it was digging into her skin and caused pain, so she continued to wear her cam boot instead  She admits that she has been removing the cam boot while around the house, and ambulating without complaint  She has no other questions or concerns today  PAST MEDICAL HISTORY:  Past Medical History:   Diagnosis Date    Arthritis     [facility]     [facility]     Renal disorder        PAST SURGICAL HISTORY:  Past Surgical History:   Procedure Laterality Date    CORONARY ANGIOPLASTY WITH STENT PLACEMENT         FAMILY HISTORY:  History reviewed  No pertinent family history      SOCIAL HISTORY:  Social History     Tobacco Use    Smoking status: Never Smoker    Smokeless tobacco: Never Used   Vaping Use    Vaping Use: Never used   Substance Use Topics    Alcohol use: Never    Drug use: Never       MEDICATIONS:    Current Outpatient Medications:     acetaminophen (TYLENOL) 500 mg tablet, Take 500 mg by mouth, Disp: , Rfl:     amLODIPine-valsartan (EXFORGE) 5-160 MG per tablet, Take 1 tablet by mouth daily , Disp: , Rfl:     aspirin (ECOTRIN LOW STRENGTH) 81 mg EC tablet, Take by mouth, Disp: , Rfl:     CALCIUM CITRATE-VITAMIN D PO, , Disp: , Rfl:     carvedilol (COREG) 3 125 mg tablet, TAKE 1 TABLET TWICE A DAY WITH FOOD, Disp: , Rfl:     Cholecalciferol (Vitamin D3) 50 MCG (2000 UT) capsule, Take by mouth, Disp: , Rfl:     clotrimazole-betamethasone (LOTRISONE) 1-0 05 % cream, , Disp: , Rfl:     diphenhydrAMINE HCl (BENADRYL ALLERGY PO), , Disp: , Rfl:     Docusate Sodium (DSS) 100 MG CAPS, Take by mouth, Disp: , Rfl:     FERROUS SULFATE PO, , Disp: , Rfl:     gabapentin (NEURONTIN) 100 mg capsule, Take by mouth, Disp: , Rfl:     glipiZIDE (GLUCOTROL XL) 2 5 mg 24 hr tablet, TAKE 2 TABLETS IN THE MORNING & TAKE 2 TABLETS IN THE EVENING, Disp: , Rfl:     Insulin Degludec 100 UNIT/ML SOLN, Inject 6 Units under the skin daily, Disp: , Rfl:     Insulin Pen Needle (Unifine Pentips) 32G X 4 MM MISC, USE 1 PER DAY WITH INSULIN PEN, Disp: , Rfl:     Lancets (onetouch ultrasoft) lancets, , Disp: , Rfl:     lidocaine (Lidoderm) 5 %, Apply 1 patch topically daily as needed (back pain) Remove & Discard patch within 12 hours or as directed by MD, Disp: 15 patch, Rfl: 0    meclizine (ANTIVERT) 25 mg tablet, One pill by mouth up to 3 times a day as needed for dizziness, Disp: , Rfl:     nitroglycerin (NITROSTAT) 0 3 mg SL tablet, Place 0 3 mg under the tongue every 5 (five) minutes as needed for chest pain, Disp: , Rfl:     nitroglycerin (NITROSTAT) 0 4 mg SL tablet, , Disp: , Rfl:     nystatin (MYCOSTATIN) 500,000 units/5 mL suspension, Take 500,000 Units by mouth 4 (four) times a day, Disp: , Rfl:     Omega-3 Fatty Acids (FISH OIL OMEGA-3 PO), , Disp: , Rfl:     omeprazole (PriLOSEC) 40 MG capsule, Take 40 mg by mouth daily, Disp: , Rfl:     ondansetron (ZOFRAN) 4 mg tablet, Take by mouth, Disp: , Rfl:     ondansetron (ZOFRAN-ODT) 4 mg disintegrating tablet, Take 4 mg by mouth every 8 (eight) hours as needed, Disp: , Rfl:     tamoxifen (NOLVADEX) 20 mg tablet, Take 20 mg by mouth 2 (two) times a day, Disp: , Rfl:     Unifine Pentips 32G X 4 MM MISC, , Disp: , Rfl:     Vitamin Mixture (VITAMIN E COMPLETE PO), , Disp: , Rfl:     warfarin (COUMADIN) 3 mg tablet, Take 1 5 mg by mouth daily, Disp: , Rfl:     polyethylene glycol (MIRALAX) 17 g packet, Take 17 g by mouth daily for 7 days, Disp: 119 g, Rfl: 0    ALLERGIES:  Allergies   Allergen Reactions    Atorvastatin Other (See Comments)    Calcium Channel Blockers      Patient unable to remember symptoms    Cephalexin      Reports hallucinations    Ezetimibe-Simvastatin Other (See Comments)    Fluoxetine      Patient unable to remember symptoms    Guaifenesin & Derivatives Hives     Possible hives / urticaria      H2 Antagonists      Pepcid caused pain in stomach    Metoprolol      Patient unable to remember symptoms    Niacin Er     Nitrofurantoin     Other Other (See Comments)    Penicillin G Other (See Comments)    Quinolones      Patient unable to remember symptoms    Rofecoxib      LEG SWELLING      Rosuvastatin Other (See Comments)    Statins Other (See Comments)    Sulfa Antibiotics GI Intolerance and Other (See Comments)     rash  rash    Welchol [Colesevelam]     Zetia [Ezetimibe]        REVIEW OF SYSTEMS:  Pertinent items are noted in HPI    A comprehensive review of systems was negative       _____________________________________________________  PHYSICAL EXAMINATION:  General: well developed and well nourished, alert, oriented times 3 and appears comfortable  Psychiatric: Normal  HEENT:  Normocephalic, atraumatic  Cardiovascular:  Regular  Pulmonary: No wheezing or stridor  Skin: No masses, erthema, lacerations, fluctation, ulcerations  Neurovascular: strong distal pulses, sensory and motor testing intact     MUSCULOSKELETAL EXAMINATION:    Right ankle:  - patient presents with cam boot, which was removed for examination without difficulty  - the patient is able to ambulate well without abnormal gait, with assistance from her wheeled walker  - skin without lesions, ecchymosis, erythema, swelling, warmth, or other signs of infection  - there is no palpable tenderness  - full active ankle ROM, full ROM of all toes  - ankle is stable to varus and valgus stress  - 5/5 strength resisted ankle dorsiflexion/plantarflexion  - strong pedal pulse  - sensation intact to light touch along the DP/SP/SA/Guzman/T nerve distributions  - brisk cap refill in all toes    _____________________________________________________  STUDIES REVIEWED:  I have personally reviewed pertinent films and reports in PACS and my interpretation is as follows:     X-rays taken today in office demonstrate a well healed distal fibula fracture, well maintained alignment compared to previous imaging  PROCEDURES PERFORMED:   Procedures  None performed today            Grace Gross PA-C

## 2022-10-03 ENCOUNTER — TELEPHONE (OUTPATIENT)
Dept: ADMINISTRATIVE | Facility: OTHER | Age: 87
End: 2022-10-03

## 2022-12-16 ENCOUNTER — DOCTOR'S OFFICE (OUTPATIENT)
Dept: URBAN - NONMETROPOLITAN AREA CLINIC 1 | Facility: CLINIC | Age: 87
Setting detail: OPHTHALMOLOGY
End: 2022-12-16
Payer: COMMERCIAL

## 2022-12-16 DIAGNOSIS — E11.3293: ICD-10-CM

## 2022-12-16 DIAGNOSIS — H04.123: ICD-10-CM

## 2022-12-16 DIAGNOSIS — H43.813: ICD-10-CM

## 2022-12-16 DIAGNOSIS — H35.373: ICD-10-CM

## 2022-12-16 PROCEDURE — 92134 CPTRZ OPH DX IMG PST SGM RTA: CPT | Performed by: OPHTHALMOLOGY

## 2022-12-16 PROCEDURE — 99214 OFFICE O/P EST MOD 30 MIN: CPT | Performed by: OPHTHALMOLOGY

## 2022-12-16 PROCEDURE — 92202 OPSCPY EXTND ON/MAC DRAW: CPT | Performed by: OPHTHALMOLOGY

## 2022-12-16 ASSESSMENT — REFRACTION_MANIFEST
OS_ADD: +2.50
OD_VA1: 20/20
OD_CYLINDER: -0.25
OD_SPHERE: PLANO
OD_ADD: +2.50
OD_AXIS: 095
OS_VA1: 20/20
OS_VA2: 20/20
OD_VA2: 20/20
OS_SPHERE: PLANO
OS_AXIS: 030
OS_CYLINDER: -0.50

## 2022-12-16 ASSESSMENT — REFRACTION_AUTOREFRACTION
OS_AXIS: 62
OD_SPHERE: +0.25
OD_CYLINDER: 0.00
OS_SPHERE: +0.50
OS_CYLINDER: -1.50

## 2022-12-16 ASSESSMENT — LID POSITION - DERMATOCHALASIS
OS_DERMATOCHALASIS: LUL 2+
OD_DERMATOCHALASIS: RUL 2+

## 2022-12-16 ASSESSMENT — REFRACTION_CURRENTRX
OD_CYLINDER: -0.25
OD_AXIS: 93
OS_VPRISM_DIRECTION: BF
OS_AXIS: 29
OD_ADD: +2.75
OS_ADD: +2.75
OD_VPRISM_DIRECTION: BF
OS_OVR_VA: 20/
OD_OVR_VA: 20/
OD_SPHERE: PLANO
OS_CYLINDER: -0.50
OS_SPHERE: PLANO

## 2022-12-16 ASSESSMENT — VISUAL ACUITY
OS_BCVA: 20/30
OD_BCVA: 20/40-2

## 2022-12-16 ASSESSMENT — SPHEQUIV_DERIVED
OS_SPHEQUIV: -0.25
OD_SPHEQUIV: 0.25

## 2022-12-16 ASSESSMENT — SUPERFICIAL PUNCTATE KERATITIS (SPK)
OD_SPK: 1+
OS_SPK: 1+

## 2022-12-16 ASSESSMENT — PUNCTA - ASSESSMENT
OD_PUNCTA: 3MON PLUG RLL SMALL
OS_PUNCTA: SIL PLUG LLL SMALL

## 2022-12-16 ASSESSMENT — CONFRONTATIONAL VISUAL FIELD TEST (CVF)
OD_FINDINGS: FULL
OS_FINDINGS: FULL

## 2022-12-27 ENCOUNTER — DOCTOR'S OFFICE (OUTPATIENT)
Dept: URBAN - NONMETROPOLITAN AREA CLINIC 1 | Facility: CLINIC | Age: 87
Setting detail: OPHTHALMOLOGY
End: 2022-12-27
Payer: COMMERCIAL

## 2022-12-27 ENCOUNTER — RX ONLY (RX ONLY)
Age: 87
End: 2022-12-27

## 2022-12-27 VITALS — HEIGHT: 55 IN

## 2022-12-27 DIAGNOSIS — L03.213: ICD-10-CM

## 2022-12-27 DIAGNOSIS — H00.11: ICD-10-CM

## 2022-12-27 PROCEDURE — 92285 EXTERNAL OCULAR PHOTOGRAPHY: CPT | Performed by: OPHTHALMOLOGY

## 2022-12-27 PROCEDURE — 99214 OFFICE O/P EST MOD 30 MIN: CPT | Performed by: OPHTHALMOLOGY

## 2022-12-27 ASSESSMENT — KERATOMETRY
OS_K2POWER_DIOPTERS: 47.00
OD_K1POWER_DIOPTERS: 46.50
OS_K1POWER_DIOPTERS: 46.00
OS_AXISANGLE_DEGREES: 124
OD_K2POWER_DIOPTERS: 47.00
OD_AXISANGLE_DEGREES: 107

## 2022-12-27 ASSESSMENT — LID POSITION - DERMATOCHALASIS
OS_DERMATOCHALASIS: LUL 2+
OD_DERMATOCHALASIS: RUL 2+

## 2022-12-27 ASSESSMENT — CONFRONTATIONAL VISUAL FIELD TEST (CVF)
OS_FINDINGS: FULL
OD_FINDINGS: FULL

## 2022-12-27 ASSESSMENT — REFRACTION_CURRENTRX
OD_SPHERE: PLANO
OS_OVR_VA: 20/
OS_OVR_VA: 20/
OD_VPRISM_DIRECTION: PROGS
OD_VPRISM_DIRECTION: BF
OD_SPHERE: +0.25
OD_AXIS: 101
OS_ADD: +2.75
OS_VPRISM_DIRECTION: PROGS
OS_SPHERE: PLANO
OS_CYLINDER: -0.50
OS_CYLINDER: -0.50
OS_ADD: +2.50
OD_CYLINDER: -0.25
OS_AXIS: 029
OS_AXIS: 29
OD_OVR_VA: 20/
OD_ADD: +2.50
OS_VPRISM_DIRECTION: BF
OS_SPHERE: PLANO
OD_CYLINDER: -0.25
OD_AXIS: 93
OD_ADD: +2.75
OD_OVR_VA: 20/

## 2022-12-27 ASSESSMENT — SUPERFICIAL PUNCTATE KERATITIS (SPK)
OS_SPK: 1+
OD_SPK: 1+

## 2022-12-27 ASSESSMENT — AXIALLENGTH_DERIVED
OD_AL: 22.4116
OS_AL: 22.7636

## 2022-12-27 ASSESSMENT — REFRACTION_AUTOREFRACTION
OS_SPHERE: -0.25
OS_CYLINDER: -0.75
OD_AXIS: 095
OS_AXIS: 039
OD_CYLINDER: -1.75
OD_SPHERE: +1.00

## 2022-12-27 ASSESSMENT — PUNCTA - ASSESSMENT
OD_PUNCTA: SIL PLUG RLL SMALL
OS_PUNCTA: SIL PLUG LLL SMALL

## 2022-12-27 ASSESSMENT — REFRACTION_MANIFEST
OD_SPHERE: PLANO
OD_CYLINDER: -0.25
OD_ADD: +2.50
OS_CYLINDER: -0.50
OD_VA1: 20/20
OS_AXIS: 030
OD_VA2: 20/20
OD_AXIS: 095
OS_VA2: 20/20
OS_ADD: +2.50
OS_SPHERE: PLANO
OS_VA1: 20/20

## 2022-12-27 ASSESSMENT — LID EXAM ASSESSMENTS
OD_EDEMA: RUL 1+
OD_COMMENTS: 1+ ERYTHEMA

## 2022-12-27 ASSESSMENT — SPHEQUIV_DERIVED
OD_SPHEQUIV: 0.125
OS_SPHEQUIV: -0.625

## 2022-12-27 ASSESSMENT — VISUAL ACUITY
OS_BCVA: 20/50-1
OD_BCVA: 20/30+1

## 2023-01-06 ENCOUNTER — DOCTOR'S OFFICE (OUTPATIENT)
Dept: URBAN - NONMETROPOLITAN AREA CLINIC 1 | Facility: CLINIC | Age: 88
Setting detail: OPHTHALMOLOGY
End: 2023-01-06
Payer: COMMERCIAL

## 2023-01-06 DIAGNOSIS — L03.213: ICD-10-CM

## 2023-01-06 DIAGNOSIS — H00.11: ICD-10-CM

## 2023-01-06 PROBLEM — E11.3291 DM TYPE 2; RIGHT MILD WITHOUT ME, LEFT MILD WITHOUT ME: Status: ACTIVE | Noted: 2022-12-16

## 2023-01-06 PROBLEM — E11.3292 DM TYPE 2; RIGHT MILD WITHOUT ME, LEFT MILD WITHOUT ME: Status: ACTIVE | Noted: 2022-12-16

## 2023-01-06 PROBLEM — H40.053 OCULAR HYPERTENSION; BOTH EYES: Status: ACTIVE | Noted: 2022-12-16

## 2023-01-06 PROCEDURE — 99212 OFFICE O/P EST SF 10 MIN: CPT | Performed by: OPHTHALMOLOGY

## 2023-01-06 ASSESSMENT — VISUAL ACUITY
OD_BCVA: 20/50+2
OS_BCVA: 20/50+2

## 2023-01-06 ASSESSMENT — KERATOMETRY
OD_K2POWER_DIOPTERS: 47.00
OS_K1POWER_DIOPTERS: 46.00
OS_K2POWER_DIOPTERS: 47.00
OS_AXISANGLE_DEGREES: 124
OD_AXISANGLE_DEGREES: 107
OD_K1POWER_DIOPTERS: 46.50

## 2023-01-06 ASSESSMENT — REFRACTION_MANIFEST
OD_AXIS: 095
OS_ADD: +2.50
OS_CYLINDER: -0.50
OD_CYLINDER: -0.25
OD_VA2: 20/20
OD_SPHERE: PLANO
OS_AXIS: 030
OD_VA1: 20/20
OS_VA1: 20/20
OS_VA2: 20/20
OD_ADD: +2.50
OS_SPHERE: PLANO

## 2023-01-06 ASSESSMENT — REFRACTION_CURRENTRX
OS_ADD: +2.75
OS_AXIS: 29
OD_SPHERE: PLANO
OS_OVR_VA: 20/
OS_SPHERE: PLANO
OS_VPRISM_DIRECTION: PROGS
OS_VPRISM_DIRECTION: BF
OD_ADD: +2.50
OS_OVR_VA: 20/
OS_CYLINDER: -0.50
OD_ADD: +2.75
OD_SPHERE: +0.25
OS_AXIS: 029
OD_OVR_VA: 20/
OD_VPRISM_DIRECTION: PROGS
OD_VPRISM_DIRECTION: BF
OD_CYLINDER: -0.25
OS_CYLINDER: -0.50
OD_AXIS: 101
OD_OVR_VA: 20/
OS_SPHERE: PLANO
OD_CYLINDER: -0.25
OD_AXIS: 93
OS_ADD: +2.50

## 2023-01-06 ASSESSMENT — AXIALLENGTH_DERIVED
OS_AL: 22.7636
OD_AL: 22.4116

## 2023-01-06 ASSESSMENT — REFRACTION_AUTOREFRACTION
OD_SPHERE: +1.00
OD_AXIS: 095
OS_AXIS: 039
OD_CYLINDER: -1.75
OS_CYLINDER: -0.75
OS_SPHERE: -0.25

## 2023-01-06 ASSESSMENT — LID POSITION - DERMATOCHALASIS
OD_DERMATOCHALASIS: RUL 1+
OS_DERMATOCHALASIS: LUL 2+

## 2023-01-06 ASSESSMENT — SPHEQUIV_DERIVED
OD_SPHEQUIV: 0.125
OS_SPHEQUIV: -0.625

## 2023-01-06 ASSESSMENT — CONFRONTATIONAL VISUAL FIELD TEST (CVF)
OD_FINDINGS: FULL
OS_FINDINGS: FULL

## 2023-01-06 ASSESSMENT — SUPERFICIAL PUNCTATE KERATITIS (SPK)
OD_SPK: 1+
OS_SPK: 1+

## 2023-01-06 ASSESSMENT — PUNCTA - ASSESSMENT
OS_PUNCTA: SIL PLUG LLL SMALL
OD_PUNCTA: SIL PLUG RLL SMALL

## 2023-01-31 ENCOUNTER — OPTICAL OFFICE (OUTPATIENT)
Dept: URBAN - NONMETROPOLITAN AREA CLINIC 4 | Facility: CLINIC | Age: 88
Setting detail: OPHTHALMOLOGY
End: 2023-01-31
Payer: COMMERCIAL

## 2023-01-31 ENCOUNTER — DOCTOR'S OFFICE (OUTPATIENT)
Dept: URBAN - NONMETROPOLITAN AREA CLINIC 1 | Facility: CLINIC | Age: 88
Setting detail: OPHTHALMOLOGY
End: 2023-01-31
Payer: COMMERCIAL

## 2023-01-31 VITALS — HEIGHT: 55 IN

## 2023-01-31 DIAGNOSIS — H52.223: ICD-10-CM

## 2023-01-31 DIAGNOSIS — H52.4: ICD-10-CM

## 2023-01-31 PROCEDURE — V2203 LENS SPHCYL BIFOCAL 4.00D/.1: HCPCS

## 2023-01-31 PROCEDURE — 92015 DETERMINE REFRACTIVE STATE: CPT

## 2023-01-31 PROCEDURE — V2744 TINT PHOTOCHROMATIC LENS/ES: HCPCS

## 2023-01-31 PROCEDURE — 92012 INTRM OPH EXAM EST PATIENT: CPT

## 2023-01-31 PROCEDURE — V2750 ANTI-REFLECTIVE COATING: HCPCS

## 2023-01-31 PROCEDURE — V2020 VISION SVCS FRAMES PURCHASES: HCPCS

## 2023-01-31 ASSESSMENT — REFRACTION_AUTOREFRACTION
OD_CYLINDER: -1.75
OS_CYLINDER: -0.75
OS_SPHERE: -0.25
OD_SPHERE: +1.00
OD_AXIS: 095
OS_AXIS: 039

## 2023-01-31 ASSESSMENT — REFRACTION_MANIFEST
OD_AXIS: 100
OS_VA1: 20/30+
OU_VA: 20/25+
OD_VA1: 20/25+
OD_CYLINDER: -0.75
OD_ADD: +2.75
OS_VA2: 20/30+
OS_ADD: +2.75
OS_SPHERE: PLANO
OS_AXIS: 030
OD_SPHERE: PLANO
OD_VA2: 20/25+
OS_CYLINDER: -0.25

## 2023-01-31 ASSESSMENT — REFRACTION_CURRENTRX
OD_ADD: +2.50
OS_SPHERE: PLANO
OD_SPHERE: PLANO
OD_OVR_VA: 20/
OD_CYLINDER: -0.25
OS_AXIS: 27
OD_AXIS: 93
OS_SPHERE: PLANO
OD_AXIS: 101
OS_VPRISM_DIRECTION: PROGS
OS_ADD: +2.75
OD_VPRISM_DIRECTION: BF
OS_VPRISM_DIRECTION: BF
OS_CYLINDER: -0.50
OS_OVR_VA: 20/
OD_OVR_VA: 20/
OS_ADD: +2.50
OS_OVR_VA: 20/
OS_CYLINDER: -0.50
OD_VPRISM_DIRECTION: PROGS
OD_CYLINDER: -0.25
OD_SPHERE: +0.25
OD_ADD: +2.75
OS_AXIS: 029

## 2023-01-31 ASSESSMENT — SUPERFICIAL PUNCTATE KERATITIS (SPK)
OD_SPK: 1+
OS_SPK: 1+

## 2023-01-31 ASSESSMENT — SPHEQUIV_DERIVED
OD_SPHEQUIV: 0.125
OS_SPHEQUIV: -0.625

## 2023-01-31 ASSESSMENT — LID POSITION - DERMATOCHALASIS
OD_DERMATOCHALASIS: RUL 1+
OS_DERMATOCHALASIS: LUL 2+

## 2023-01-31 ASSESSMENT — CONFRONTATIONAL VISUAL FIELD TEST (CVF)
OS_FINDINGS: FULL
OD_FINDINGS: FULL

## 2023-01-31 ASSESSMENT — PUNCTA - ASSESSMENT
OD_PUNCTA: SIL PLUG RLL SMALL
OS_PUNCTA: SIL PLUG LLL SMALL